# Patient Record
Sex: FEMALE | Race: BLACK OR AFRICAN AMERICAN | NOT HISPANIC OR LATINO | URBAN - METROPOLITAN AREA
[De-identification: names, ages, dates, MRNs, and addresses within clinical notes are randomized per-mention and may not be internally consistent; named-entity substitution may affect disease eponyms.]

---

## 2017-09-15 ENCOUNTER — EMERGENCY (EMERGENCY)
Facility: HOSPITAL | Age: 72
LOS: 1 days | Discharge: HOME | End: 2017-09-15
Attending: EMERGENCY MEDICINE | Admitting: EMERGENCY MEDICINE
Payer: MEDICARE

## 2017-09-15 VITALS
HEART RATE: 84 BPM | RESPIRATION RATE: 18 BRPM | SYSTOLIC BLOOD PRESSURE: 154 MMHG | TEMPERATURE: 98 F | OXYGEN SATURATION: 99 % | DIASTOLIC BLOOD PRESSURE: 80 MMHG

## 2017-09-15 VITALS
HEART RATE: 96 BPM | WEIGHT: 231.93 LBS | DIASTOLIC BLOOD PRESSURE: 81 MMHG | SYSTOLIC BLOOD PRESSURE: 136 MMHG | TEMPERATURE: 100 F | OXYGEN SATURATION: 98 % | RESPIRATION RATE: 20 BRPM

## 2017-09-15 DIAGNOSIS — Z90.710 ACQUIRED ABSENCE OF BOTH CERVIX AND UTERUS: Chronic | ICD-10-CM

## 2017-09-15 LAB
ALBUMIN SERPL ELPH-MCNC: 3.9 G/DL — SIGNIFICANT CHANGE UP (ref 3.3–5)
ALP SERPL-CCNC: 72 U/L — SIGNIFICANT CHANGE UP (ref 40–120)
ALT FLD-CCNC: 17 U/L RC — SIGNIFICANT CHANGE UP (ref 10–45)
ANION GAP SERPL CALC-SCNC: 14 MMOL/L — SIGNIFICANT CHANGE UP (ref 5–17)
APPEARANCE UR: CLEAR — SIGNIFICANT CHANGE UP
AST SERPL-CCNC: 25 U/L — SIGNIFICANT CHANGE UP (ref 10–40)
BASOPHILS # BLD AUTO: 0.1 K/UL — SIGNIFICANT CHANGE UP (ref 0–0.2)
BASOPHILS NFR BLD AUTO: 0.6 % — SIGNIFICANT CHANGE UP (ref 0–2)
BILIRUB SERPL-MCNC: 0.4 MG/DL — SIGNIFICANT CHANGE UP (ref 0.2–1.2)
BILIRUB UR-MCNC: NEGATIVE — SIGNIFICANT CHANGE UP
BUN SERPL-MCNC: 17 MG/DL — SIGNIFICANT CHANGE UP (ref 7–23)
CALCIUM SERPL-MCNC: 9.3 MG/DL — SIGNIFICANT CHANGE UP (ref 8.4–10.5)
CHLORIDE SERPL-SCNC: 101 MMOL/L — SIGNIFICANT CHANGE UP (ref 96–108)
CO2 SERPL-SCNC: 25 MMOL/L — SIGNIFICANT CHANGE UP (ref 22–31)
COLOR SPEC: YELLOW — SIGNIFICANT CHANGE UP
CREAT SERPL-MCNC: 0.99 MG/DL — SIGNIFICANT CHANGE UP (ref 0.5–1.3)
DIFF PNL FLD: NEGATIVE — SIGNIFICANT CHANGE UP
EOSINOPHIL # BLD AUTO: 0.3 K/UL — SIGNIFICANT CHANGE UP (ref 0–0.5)
EOSINOPHIL NFR BLD AUTO: 2.7 % — SIGNIFICANT CHANGE UP (ref 0–6)
GAS PNL BLDV: SIGNIFICANT CHANGE UP
GLUCOSE SERPL-MCNC: 95 MG/DL — SIGNIFICANT CHANGE UP (ref 70–99)
GLUCOSE UR QL: NEGATIVE — SIGNIFICANT CHANGE UP
HCT VFR BLD CALC: 41.4 % — SIGNIFICANT CHANGE UP (ref 34.5–45)
HGB BLD-MCNC: 13.4 G/DL — SIGNIFICANT CHANGE UP (ref 11.5–15.5)
KETONES UR-MCNC: NEGATIVE — SIGNIFICANT CHANGE UP
LEUKOCYTE ESTERASE UR-ACNC: ABNORMAL
LYMPHOCYTES # BLD AUTO: 1.6 K/UL — SIGNIFICANT CHANGE UP (ref 1–3.3)
LYMPHOCYTES # BLD AUTO: 15.1 % — SIGNIFICANT CHANGE UP (ref 13–44)
MAGNESIUM SERPL-MCNC: 1.8 MG/DL — SIGNIFICANT CHANGE UP (ref 1.6–2.6)
MCHC RBC-ENTMCNC: 28.3 PG — SIGNIFICANT CHANGE UP (ref 27–34)
MCHC RBC-ENTMCNC: 32.3 GM/DL — SIGNIFICANT CHANGE UP (ref 32–36)
MCV RBC AUTO: 87.6 FL — SIGNIFICANT CHANGE UP (ref 80–100)
MONOCYTES # BLD AUTO: 1 K/UL — HIGH (ref 0–0.9)
MONOCYTES NFR BLD AUTO: 9.1 % — SIGNIFICANT CHANGE UP (ref 2–14)
NEUTROPHILS # BLD AUTO: 7.7 K/UL — HIGH (ref 1.8–7.4)
NEUTROPHILS NFR BLD AUTO: 72.5 % — SIGNIFICANT CHANGE UP (ref 43–77)
NITRITE UR-MCNC: NEGATIVE — SIGNIFICANT CHANGE UP
PH UR: 5.5 — SIGNIFICANT CHANGE UP (ref 5–8)
PHOSPHATE SERPL-MCNC: 2.9 MG/DL — SIGNIFICANT CHANGE UP (ref 2.5–4.5)
PLATELET # BLD AUTO: 299 K/UL — SIGNIFICANT CHANGE UP (ref 150–400)
POTASSIUM SERPL-MCNC: 4.3 MMOL/L — SIGNIFICANT CHANGE UP (ref 3.5–5.3)
POTASSIUM SERPL-SCNC: 4.3 MMOL/L — SIGNIFICANT CHANGE UP (ref 3.5–5.3)
PROT SERPL-MCNC: 7.7 G/DL — SIGNIFICANT CHANGE UP (ref 6–8.3)
PROT UR-MCNC: NEGATIVE — SIGNIFICANT CHANGE UP
RBC # BLD: 4.73 M/UL — SIGNIFICANT CHANGE UP (ref 3.8–5.2)
RBC # FLD: 13.5 % — SIGNIFICANT CHANGE UP (ref 10.3–14.5)
SODIUM SERPL-SCNC: 140 MMOL/L — SIGNIFICANT CHANGE UP (ref 135–145)
SP GR SPEC: 1.02 — SIGNIFICANT CHANGE UP (ref 1.01–1.02)
UROBILINOGEN FLD QL: NEGATIVE — SIGNIFICANT CHANGE UP
WBC # BLD: 10.6 K/UL — HIGH (ref 3.8–10.5)
WBC # FLD AUTO: 10.6 K/UL — HIGH (ref 3.8–10.5)

## 2017-09-15 PROCEDURE — 96374 THER/PROPH/DIAG INJ IV PUSH: CPT

## 2017-09-15 PROCEDURE — 85027 COMPLETE CBC AUTOMATED: CPT

## 2017-09-15 PROCEDURE — 85014 HEMATOCRIT: CPT

## 2017-09-15 PROCEDURE — 82435 ASSAY OF BLOOD CHLORIDE: CPT

## 2017-09-15 PROCEDURE — 99284 EMERGENCY DEPT VISIT MOD MDM: CPT | Mod: GC

## 2017-09-15 PROCEDURE — 74176 CT ABD & PELVIS W/O CONTRAST: CPT

## 2017-09-15 PROCEDURE — 82803 BLOOD GASES ANY COMBINATION: CPT

## 2017-09-15 PROCEDURE — 83605 ASSAY OF LACTIC ACID: CPT

## 2017-09-15 PROCEDURE — 81001 URINALYSIS AUTO W/SCOPE: CPT

## 2017-09-15 PROCEDURE — 84100 ASSAY OF PHOSPHORUS: CPT

## 2017-09-15 PROCEDURE — 84132 ASSAY OF SERUM POTASSIUM: CPT

## 2017-09-15 PROCEDURE — 82947 ASSAY GLUCOSE BLOOD QUANT: CPT

## 2017-09-15 PROCEDURE — 80053 COMPREHEN METABOLIC PANEL: CPT

## 2017-09-15 PROCEDURE — 84295 ASSAY OF SERUM SODIUM: CPT

## 2017-09-15 PROCEDURE — 74176 CT ABD & PELVIS W/O CONTRAST: CPT | Mod: 26

## 2017-09-15 PROCEDURE — 82330 ASSAY OF CALCIUM: CPT

## 2017-09-15 PROCEDURE — 99284 EMERGENCY DEPT VISIT MOD MDM: CPT | Mod: 25

## 2017-09-15 PROCEDURE — 87086 URINE CULTURE/COLONY COUNT: CPT

## 2017-09-15 PROCEDURE — 83735 ASSAY OF MAGNESIUM: CPT

## 2017-09-15 RX ORDER — METRONIDAZOLE 500 MG
500 TABLET ORAL ONCE
Qty: 0 | Refills: 0 | Status: COMPLETED | OUTPATIENT
Start: 2017-09-15 | End: 2017-09-15

## 2017-09-15 RX ORDER — METOPROLOL TARTRATE 50 MG
25 TABLET ORAL
Qty: 0 | Refills: 0 | Status: DISCONTINUED | OUTPATIENT
Start: 2017-09-15 | End: 2017-09-15

## 2017-09-15 RX ORDER — SODIUM CHLORIDE 9 MG/ML
1000 INJECTION INTRAMUSCULAR; INTRAVENOUS; SUBCUTANEOUS
Qty: 0 | Refills: 0 | Status: DISCONTINUED | OUTPATIENT
Start: 2017-09-15 | End: 2017-09-19

## 2017-09-15 RX ORDER — CIPROFLOXACIN LACTATE 400MG/40ML
1 VIAL (ML) INTRAVENOUS
Qty: 20 | Refills: 0
Start: 2017-09-15 | End: 2017-09-25

## 2017-09-15 RX ORDER — METRONIDAZOLE 500 MG
1 TABLET ORAL
Qty: 21 | Refills: 0
Start: 2017-09-15 | End: 2017-09-22

## 2017-09-15 RX ORDER — ACETAMINOPHEN 500 MG
1000 TABLET ORAL ONCE
Qty: 0 | Refills: 0 | Status: COMPLETED | OUTPATIENT
Start: 2017-09-15 | End: 2017-09-15

## 2017-09-15 RX ORDER — CIPROFLOXACIN LACTATE 400MG/40ML
500 VIAL (ML) INTRAVENOUS ONCE
Qty: 0 | Refills: 0 | Status: COMPLETED | OUTPATIENT
Start: 2017-09-15 | End: 2017-09-15

## 2017-09-15 RX ADMIN — Medication 1000 MILLIGRAM(S): at 15:58

## 2017-09-15 RX ADMIN — SODIUM CHLORIDE 150 MILLILITER(S): 9 INJECTION INTRAMUSCULAR; INTRAVENOUS; SUBCUTANEOUS at 15:32

## 2017-09-15 RX ADMIN — Medication 500 MILLIGRAM(S): at 19:06

## 2017-09-15 RX ADMIN — Medication 400 MILLIGRAM(S): at 15:49

## 2017-09-15 NOTE — ED PROVIDER NOTE - OBJECTIVE STATEMENT
72F with LLQ abd pain, h/o diverticulitis. 1 day of pain, moderate, sharp, nonradiating, no n/v/f/c, no urinary sx's, has not taken anything for pain yet

## 2017-09-15 NOTE — ED ADULT NURSE NOTE - OBJECTIVE STATEMENT
72 year old female presented to ED with c/o of lower abdominal pain since Wednesday. Pt denies pain radiating anywhere. Pt states the pain is aching and 5/10 on verbal pain scale. Pt denies CP, SOB, nausea/vomiting, numbness/tingling, fever, cough, chills, dizziness, headache. Pt a&ox3, lung sounds clear, heart rate regular, abdomen soft nontender nondistended to palp, +BS in all four quadrants, normal BM, voiding regular. Skin intact. IV in left AC 20G and patent. Pt currently resting in bed with family at bedside, side rails up for safety. Will continue to monitor and reassess while offering support and reassurance.

## 2017-09-15 NOTE — ED PROVIDER NOTE - ATTENDING CONTRIBUTION TO CARE
Attending MD Arteaga:  I personally have seen and examined this patient.  Resident note reviewed and agree on plan of care and except where noted.  See HPI, PE, and MDM for details.

## 2017-09-15 NOTE — ED PROVIDER NOTE - PHYSICAL EXAMINATION
Attending MD Arteaga: A & O x 3, NAD, obese, EOMI b/l, PERRL b/l; lungs CTAB, heart with reg rhythm without murmur; abdomen soft ND, mild ttp in LLQ, extremities with no edema; affect appropriate. neuro exam non focal with no motor or sensory deficits.

## 2017-09-15 NOTE — ED PROVIDER NOTE - PROGRESS NOTE DETAILS
attending Brent: Pt signed out to me by Dr. Arteaga at usual time of shift change pending CT A/P. CT shows uncomplicated diverticulitis. Results reviewed with patient and  at bedside. Will dc with cipro/flagyl, instruction to refrain from alcohol while taking flagyl, GI follow-up and strict return precautions.

## 2017-09-15 NOTE — ED PROVIDER NOTE - MEDICAL DECISION MAKING DETAILS
Attending MD Arteaga: 72F with h/o diverticulitis pw LLQ abd pain, mild ttp in LLQ, eval diverticulitis with CT a/p, re-eval

## 2017-09-15 NOTE — ED ADULT NURSE NOTE - PMH
Bone Spur right foot -current    CVA (Cerebral Infarction) s/p CVA 2007 - no residual symptoms    DM (Diabetes Mellitus)    Heart Murmur    History of Tonsillectomy    HTN (Hypertension)    Hypercholesteremia    Scoliosis    Uterine Leiomyoma s/p supracervical hysterectomy; BSO 1992

## 2017-09-15 NOTE — ED ADULT NURSE NOTE - CHPI ED SYMPTOMS NEG
no blood in stool/no vomiting/no nausea/no chills/no diarrhea/no fever/no burning urination/no abdominal distension/no dysuria/no hematuria

## 2017-09-16 LAB
C TRACH RRNA SPEC QL NAA+PROBE: SIGNIFICANT CHANGE UP
CULTURE RESULTS: NO GROWTH — SIGNIFICANT CHANGE UP
N GONORRHOEA RRNA SPEC QL NAA+PROBE: SIGNIFICANT CHANGE UP
SPECIMEN SOURCE: SIGNIFICANT CHANGE UP
SPECIMEN SOURCE: SIGNIFICANT CHANGE UP

## 2019-10-10 ENCOUNTER — INPATIENT (INPATIENT)
Facility: HOSPITAL | Age: 74
LOS: 1 days | Discharge: HOME | DRG: 392 | End: 2019-10-12
Attending: SURGERY | Admitting: SURGERY
Payer: MEDICARE

## 2019-10-10 VITALS
HEART RATE: 83 BPM | OXYGEN SATURATION: 96 % | TEMPERATURE: 98 F | RESPIRATION RATE: 20 BRPM | DIASTOLIC BLOOD PRESSURE: 84 MMHG | SYSTOLIC BLOOD PRESSURE: 143 MMHG | WEIGHT: 225.09 LBS | HEIGHT: 65 IN

## 2019-10-10 DIAGNOSIS — Z90.710 ACQUIRED ABSENCE OF BOTH CERVIX AND UTERUS: Chronic | ICD-10-CM

## 2019-10-10 LAB
ALBUMIN SERPL ELPH-MCNC: 3.7 G/DL — SIGNIFICANT CHANGE UP (ref 3.3–5)
ALP SERPL-CCNC: 72 U/L — SIGNIFICANT CHANGE UP (ref 40–120)
ALT FLD-CCNC: 13 U/L — SIGNIFICANT CHANGE UP (ref 10–45)
ANION GAP SERPL CALC-SCNC: 12 MMOL/L — SIGNIFICANT CHANGE UP (ref 5–17)
APPEARANCE UR: CLEAR — SIGNIFICANT CHANGE UP
AST SERPL-CCNC: 13 U/L — SIGNIFICANT CHANGE UP (ref 10–40)
BACTERIA # UR AUTO: NEGATIVE — SIGNIFICANT CHANGE UP
BASE EXCESS BLDV CALC-SCNC: 4 MMOL/L — HIGH (ref -2–2)
BASOPHILS # BLD AUTO: 0.06 K/UL — SIGNIFICANT CHANGE UP (ref 0–0.2)
BASOPHILS NFR BLD AUTO: 0.6 % — SIGNIFICANT CHANGE UP (ref 0–2)
BILIRUB SERPL-MCNC: 0.2 MG/DL — SIGNIFICANT CHANGE UP (ref 0.2–1.2)
BILIRUB UR-MCNC: NEGATIVE — SIGNIFICANT CHANGE UP
BUN SERPL-MCNC: 14 MG/DL — SIGNIFICANT CHANGE UP (ref 7–23)
CA-I SERPL-SCNC: 1.18 MMOL/L — SIGNIFICANT CHANGE UP (ref 1.12–1.3)
CALCIUM SERPL-MCNC: 9.2 MG/DL — SIGNIFICANT CHANGE UP (ref 8.4–10.5)
CHLORIDE BLDV-SCNC: 107 MMOL/L — SIGNIFICANT CHANGE UP (ref 96–108)
CHLORIDE SERPL-SCNC: 101 MMOL/L — SIGNIFICANT CHANGE UP (ref 96–108)
CO2 BLDV-SCNC: 32 MMOL/L — HIGH (ref 22–30)
CO2 SERPL-SCNC: 27 MMOL/L — SIGNIFICANT CHANGE UP (ref 22–31)
COLOR SPEC: SIGNIFICANT CHANGE UP
CREAT SERPL-MCNC: 0.95 MG/DL — SIGNIFICANT CHANGE UP (ref 0.5–1.3)
DIFF PNL FLD: NEGATIVE — SIGNIFICANT CHANGE UP
EOSINOPHIL # BLD AUTO: 0.38 K/UL — SIGNIFICANT CHANGE UP (ref 0–0.5)
EOSINOPHIL NFR BLD AUTO: 3.7 % — SIGNIFICANT CHANGE UP (ref 0–6)
EPI CELLS # UR: 0 /HPF — SIGNIFICANT CHANGE UP
GAS PNL BLDV: 138 MMOL/L — SIGNIFICANT CHANGE UP (ref 135–145)
GAS PNL BLDV: SIGNIFICANT CHANGE UP
GAS PNL BLDV: SIGNIFICANT CHANGE UP
GLUCOSE BLDV-MCNC: 172 MG/DL — HIGH (ref 70–99)
GLUCOSE SERPL-MCNC: 180 MG/DL — HIGH (ref 70–99)
GLUCOSE UR QL: NEGATIVE — SIGNIFICANT CHANGE UP
HCO3 BLDV-SCNC: 30 MMOL/L — HIGH (ref 21–29)
HCT VFR BLD CALC: 40.6 % — SIGNIFICANT CHANGE UP (ref 34.5–45)
HCT VFR BLDA CALC: 41 % — SIGNIFICANT CHANGE UP (ref 39–50)
HGB BLD CALC-MCNC: 13.4 G/DL — SIGNIFICANT CHANGE UP (ref 11.5–15.5)
HGB BLD-MCNC: 12.6 G/DL — SIGNIFICANT CHANGE UP (ref 11.5–15.5)
HYALINE CASTS # UR AUTO: 0 /LPF — SIGNIFICANT CHANGE UP (ref 0–2)
IMM GRANULOCYTES NFR BLD AUTO: 1.1 % — SIGNIFICANT CHANGE UP (ref 0–1.5)
KETONES UR-MCNC: NEGATIVE — SIGNIFICANT CHANGE UP
LACTATE BLDV-MCNC: 1.5 MMOL/L — SIGNIFICANT CHANGE UP (ref 0.7–2)
LEUKOCYTE ESTERASE UR-ACNC: ABNORMAL
LIDOCAIN IGE QN: 22 U/L — SIGNIFICANT CHANGE UP (ref 7–60)
LYMPHOCYTES # BLD AUTO: 1.41 K/UL — SIGNIFICANT CHANGE UP (ref 1–3.3)
LYMPHOCYTES # BLD AUTO: 13.8 % — SIGNIFICANT CHANGE UP (ref 13–44)
MCHC RBC-ENTMCNC: 26.5 PG — LOW (ref 27–34)
MCHC RBC-ENTMCNC: 31 GM/DL — LOW (ref 32–36)
MCV RBC AUTO: 85.5 FL — SIGNIFICANT CHANGE UP (ref 80–100)
MONOCYTES # BLD AUTO: 0.88 K/UL — SIGNIFICANT CHANGE UP (ref 0–0.9)
MONOCYTES NFR BLD AUTO: 8.6 % — SIGNIFICANT CHANGE UP (ref 2–14)
NEUTROPHILS # BLD AUTO: 7.35 K/UL — SIGNIFICANT CHANGE UP (ref 1.8–7.4)
NEUTROPHILS NFR BLD AUTO: 72.2 % — SIGNIFICANT CHANGE UP (ref 43–77)
NITRITE UR-MCNC: NEGATIVE — SIGNIFICANT CHANGE UP
NRBC # BLD: 0 /100 WBCS — SIGNIFICANT CHANGE UP (ref 0–0)
PCO2 BLDV: 54 MMHG — HIGH (ref 35–50)
PH BLDV: 7.36 — SIGNIFICANT CHANGE UP (ref 7.35–7.45)
PH UR: 5.5 — SIGNIFICANT CHANGE UP (ref 5–8)
PLATELET # BLD AUTO: 370 K/UL — SIGNIFICANT CHANGE UP (ref 150–400)
PO2 BLDV: 26 MMHG — SIGNIFICANT CHANGE UP (ref 25–45)
POTASSIUM BLDV-SCNC: 3.5 MMOL/L — SIGNIFICANT CHANGE UP (ref 3.5–5.3)
POTASSIUM SERPL-MCNC: 3.5 MMOL/L — SIGNIFICANT CHANGE UP (ref 3.5–5.3)
POTASSIUM SERPL-SCNC: 3.5 MMOL/L — SIGNIFICANT CHANGE UP (ref 3.5–5.3)
PROT SERPL-MCNC: 7 G/DL — SIGNIFICANT CHANGE UP (ref 6–8.3)
PROT UR-MCNC: NEGATIVE — SIGNIFICANT CHANGE UP
RBC # BLD: 4.75 M/UL — SIGNIFICANT CHANGE UP (ref 3.8–5.2)
RBC # FLD: 15.5 % — HIGH (ref 10.3–14.5)
RBC CASTS # UR COMP ASSIST: 1 /HPF — SIGNIFICANT CHANGE UP (ref 0–4)
SAO2 % BLDV: 39 % — LOW (ref 67–88)
SODIUM SERPL-SCNC: 140 MMOL/L — SIGNIFICANT CHANGE UP (ref 135–145)
SP GR SPEC: 1.02 — SIGNIFICANT CHANGE UP (ref 1.01–1.02)
UROBILINOGEN FLD QL: NEGATIVE — SIGNIFICANT CHANGE UP
WBC # BLD: 10.19 K/UL — SIGNIFICANT CHANGE UP (ref 3.8–10.5)
WBC # FLD AUTO: 10.19 K/UL — SIGNIFICANT CHANGE UP (ref 3.8–10.5)
WBC UR QL: 2 /HPF — SIGNIFICANT CHANGE UP (ref 0–5)

## 2019-10-10 PROCEDURE — 71045 X-RAY EXAM CHEST 1 VIEW: CPT | Mod: 26

## 2019-10-10 PROCEDURE — 99285 EMERGENCY DEPT VISIT HI MDM: CPT | Mod: GC

## 2019-10-10 PROCEDURE — 74176 CT ABD & PELVIS W/O CONTRAST: CPT | Mod: 26

## 2019-10-10 NOTE — ED PROVIDER NOTE - PHYSICAL EXAMINATION
GENERAL: no acute distress, non-toxic appearing  HEAD: normocephalic, atraumatic  HEENT: normal conjunctiva, oral mucosa moist, neck supple  CARDIAC: regular rate and rhythm, normal S1 and S2, no appreciable murmurs  PULM: normal breath sounds, clear to ascultation bilaterally, no rales, rhonchi, or wheezing  GI: abdomen nondistended, soft, minimal tenderness in the LLQ, no guarding or rebound tenderness  : no CVA tenderness b/l, no suprapubic pain  NEURO: AAOx3, normal speech, PERRLA, EOMI, no focal motor or sensory deficits, normal gait  MSK: no peripheral edema, no calf tenderness/redness/swelling, no visible deformities  SKIN: well-perfused, extremities warm, no visible rashes  PSYCH: appropriate mood and affect

## 2019-10-10 NOTE — ED ADULT NURSE NOTE - OBJECTIVE STATEMENT
74 year old female arrives to ED from home via walk-in complaining of abdominal pain x past week. PMH of diverticulitis. patient states pain is similar to previous diverticulitis flare ups. patient complains of pain 9/10, slight tenderness in left lower quadrant upon palpation and more frequent bowel movements. bowel movements soft but not watery. bowel sounds present in all four quadrants. no abdominal distention noted. patient denies nausea/vomiting, chest pain, SOB, fevers, and chills. IV inserted 20g left AC. family at bedside. comfort and safety measures maintained

## 2019-10-10 NOTE — ED PROVIDER NOTE - NS ED ROS FT
GENERAL: denies fever, chills  HEENT: denies headaches, lightheadedness, dizziness, vision changes, congestion, trouble swallowing  CARDIAC: denies chest pain, palpitations  PULM: denies dyspnea, wheezing, cough  GI: +abdominal pain; denies nausea, vomiting, diarrhea, constipation, melena, hematochezia  : denies dysuria, frequency, incontinence, hematuria  NEURO: denies motor weakness, sensory changes  MSK: denies joint, muscle pain  SKIN: denies new rashes, hives  HEME: denies active bleeding, bruising

## 2019-10-10 NOTE — ED PROVIDER NOTE - OBJECTIVE STATEMENT
75y/o F h/o T2DM, HTN, HLD, diverticulitis and PSH hysterectomy, ovarian cyst removal p/w abdominal pain. Pt states that the abdominal pain has been ongoing for 1 week and is characterized as non radiating sharp, localized on the left lower abdomen. Today the pain is worse and is more constant. She states this pain feels similar to a previous flare of diverticulum from last year. Denies fevers, chills, nausea, vomiting, chest pain, SOB, anorexia, constipation, diarrhea, hematochezia.

## 2019-10-10 NOTE — ED PROVIDER NOTE - ATTENDING CONTRIBUTION TO CARE
pt is a 75 y/o female presenting with llq tend with no other sts h/o diverticulitis, with iv contrast allergy last time had a po only study instead of prep, vss, abd soft, with mild llq tend, labs, ua, ct ordered.

## 2019-10-10 NOTE — ED PROVIDER NOTE - CLINICAL SUMMARY MEDICAL DECISION MAKING FREE TEXT BOX
See Attending Note 75y/o F h/o T2DM, HTN, HLD, diverticulitis and PSH hysterectomy, ovarian cyst removal p/w non radiating sharp abdominal pain localized on the left lower abdomen that feels similar to a previous flare of diverticulum from last year. Belly is soft and minimally tender in the LLQ. Pt has IV contrast allergy which she states gave her rash and does not want IV contrast. Basic labs, VBG w/ lactate, IVF, CTa/p w/ oral contrast r/i diverticulitis. Likely D/c home with PO Abx

## 2019-10-11 DIAGNOSIS — K57.92 DIVERTICULITIS OF INTESTINE, PART UNSPECIFIED, WITHOUT PERFORATION OR ABSCESS WITHOUT BLEEDING: ICD-10-CM

## 2019-10-11 DIAGNOSIS — E89.6 POSTPROCEDURAL ADRENOCORTICAL (-MEDULLARY) HYPOFUNCTION: Chronic | ICD-10-CM

## 2019-10-11 LAB
ANION GAP SERPL CALC-SCNC: 9 MMOL/L — SIGNIFICANT CHANGE UP (ref 5–17)
BUN SERPL-MCNC: 10 MG/DL — SIGNIFICANT CHANGE UP (ref 7–23)
CALCIUM SERPL-MCNC: 9.2 MG/DL — SIGNIFICANT CHANGE UP (ref 8.4–10.5)
CHLORIDE SERPL-SCNC: 105 MMOL/L — SIGNIFICANT CHANGE UP (ref 96–108)
CO2 SERPL-SCNC: 27 MMOL/L — SIGNIFICANT CHANGE UP (ref 22–31)
CREAT SERPL-MCNC: 0.95 MG/DL — SIGNIFICANT CHANGE UP (ref 0.5–1.3)
GLUCOSE SERPL-MCNC: 112 MG/DL — HIGH (ref 70–99)
HCT VFR BLD CALC: 38.2 % — SIGNIFICANT CHANGE UP (ref 34.5–45)
HCV AB S/CO SERPL IA: 0.09 S/CO — SIGNIFICANT CHANGE UP (ref 0–0.99)
HCV AB SERPL-IMP: SIGNIFICANT CHANGE UP
HGB BLD-MCNC: 12.3 G/DL — SIGNIFICANT CHANGE UP (ref 11.5–15.5)
LACTATE SERPL-SCNC: 0.8 MMOL/L — SIGNIFICANT CHANGE UP (ref 0.7–2)
MAGNESIUM SERPL-MCNC: 1.8 MG/DL — SIGNIFICANT CHANGE UP (ref 1.6–2.6)
MCHC RBC-ENTMCNC: 27.5 PG — SIGNIFICANT CHANGE UP (ref 27–34)
MCHC RBC-ENTMCNC: 32.2 GM/DL — SIGNIFICANT CHANGE UP (ref 32–36)
MCV RBC AUTO: 85.3 FL — SIGNIFICANT CHANGE UP (ref 80–100)
NRBC # BLD: 0 /100 WBCS — SIGNIFICANT CHANGE UP (ref 0–0)
PHOSPHATE SERPL-MCNC: 3.1 MG/DL — SIGNIFICANT CHANGE UP (ref 2.5–4.5)
PLATELET # BLD AUTO: 334 K/UL — SIGNIFICANT CHANGE UP (ref 150–400)
POTASSIUM SERPL-MCNC: 4.4 MMOL/L — SIGNIFICANT CHANGE UP (ref 3.5–5.3)
POTASSIUM SERPL-SCNC: 4.4 MMOL/L — SIGNIFICANT CHANGE UP (ref 3.5–5.3)
RBC # BLD: 4.48 M/UL — SIGNIFICANT CHANGE UP (ref 3.8–5.2)
RBC # FLD: 15.6 % — HIGH (ref 10.3–14.5)
SODIUM SERPL-SCNC: 141 MMOL/L — SIGNIFICANT CHANGE UP (ref 135–145)
WBC # BLD: 8.62 K/UL — SIGNIFICANT CHANGE UP (ref 3.8–10.5)
WBC # FLD AUTO: 8.62 K/UL — SIGNIFICANT CHANGE UP (ref 3.8–10.5)

## 2019-10-11 PROCEDURE — 99223 1ST HOSP IP/OBS HIGH 75: CPT

## 2019-10-11 RX ORDER — METRONIDAZOLE 500 MG
500 TABLET ORAL EVERY 8 HOURS
Refills: 0 | Status: DISCONTINUED | OUTPATIENT
Start: 2019-10-11 | End: 2019-10-12

## 2019-10-11 RX ORDER — CIPROFLOXACIN LACTATE 400MG/40ML
400 VIAL (ML) INTRAVENOUS EVERY 12 HOURS
Refills: 0 | Status: DISCONTINUED | OUTPATIENT
Start: 2019-10-11 | End: 2019-10-12

## 2019-10-11 RX ORDER — SIMVASTATIN 20 MG/1
1 TABLET, FILM COATED ORAL
Qty: 0 | Refills: 0 | DISCHARGE

## 2019-10-11 RX ORDER — INFLUENZA VIRUS VACCINE 15; 15; 15; 15 UG/.5ML; UG/.5ML; UG/.5ML; UG/.5ML
0.5 SUSPENSION INTRAMUSCULAR ONCE
Refills: 0 | Status: DISCONTINUED | OUTPATIENT
Start: 2019-10-11 | End: 2019-10-12

## 2019-10-11 RX ORDER — CIPROFLOXACIN LACTATE 400MG/40ML
VIAL (ML) INTRAVENOUS
Refills: 0 | Status: DISCONTINUED | OUTPATIENT
Start: 2019-10-11 | End: 2019-10-12

## 2019-10-11 RX ORDER — ATORVASTATIN CALCIUM 80 MG/1
1 TABLET, FILM COATED ORAL
Qty: 0 | Refills: 0 | DISCHARGE

## 2019-10-11 RX ORDER — METRONIDAZOLE 500 MG
500 TABLET ORAL ONCE
Refills: 0 | Status: COMPLETED | OUTPATIENT
Start: 2019-10-11 | End: 2019-10-11

## 2019-10-11 RX ORDER — MAGNESIUM SULFATE 500 MG/ML
1 VIAL (ML) INJECTION ONCE
Refills: 0 | Status: COMPLETED | OUTPATIENT
Start: 2019-10-11 | End: 2019-10-11

## 2019-10-11 RX ORDER — SODIUM CHLORIDE 9 MG/ML
1000 INJECTION, SOLUTION INTRAVENOUS
Refills: 0 | Status: DISCONTINUED | OUTPATIENT
Start: 2019-10-11 | End: 2019-10-11

## 2019-10-11 RX ORDER — METRONIDAZOLE 500 MG
TABLET ORAL
Refills: 0 | Status: DISCONTINUED | OUTPATIENT
Start: 2019-10-11 | End: 2019-10-12

## 2019-10-11 RX ORDER — DEXTROSE MONOHYDRATE, SODIUM CHLORIDE, AND POTASSIUM CHLORIDE 50; .745; 4.5 G/1000ML; G/1000ML; G/1000ML
1000 INJECTION, SOLUTION INTRAVENOUS
Refills: 0 | Status: DISCONTINUED | OUTPATIENT
Start: 2019-10-11 | End: 2019-10-11

## 2019-10-11 RX ORDER — ENOXAPARIN SODIUM 100 MG/ML
40 INJECTION SUBCUTANEOUS DAILY
Refills: 0 | Status: DISCONTINUED | OUTPATIENT
Start: 2019-10-11 | End: 2019-10-12

## 2019-10-11 RX ORDER — CIPROFLOXACIN LACTATE 400MG/40ML
400 VIAL (ML) INTRAVENOUS ONCE
Refills: 0 | Status: COMPLETED | OUTPATIENT
Start: 2019-10-11 | End: 2019-10-11

## 2019-10-11 RX ADMIN — Medication 100 GRAM(S): at 10:11

## 2019-10-11 RX ADMIN — ENOXAPARIN SODIUM 40 MILLIGRAM(S): 100 INJECTION SUBCUTANEOUS at 11:52

## 2019-10-11 RX ADMIN — SODIUM CHLORIDE 75 MILLILITER(S): 9 INJECTION, SOLUTION INTRAVENOUS at 04:43

## 2019-10-11 RX ADMIN — Medication 200 MILLIGRAM(S): at 04:43

## 2019-10-11 RX ADMIN — Medication 100 MILLIGRAM(S): at 23:12

## 2019-10-11 RX ADMIN — Medication 100 MILLIGRAM(S): at 14:07

## 2019-10-11 RX ADMIN — Medication 100 MILLIGRAM(S): at 02:34

## 2019-10-11 RX ADMIN — Medication 200 MILLIGRAM(S): at 17:33

## 2019-10-11 RX ADMIN — DEXTROSE MONOHYDRATE, SODIUM CHLORIDE, AND POTASSIUM CHLORIDE 75 MILLILITER(S): 50; .745; 4.5 INJECTION, SOLUTION INTRAVENOUS at 10:11

## 2019-10-11 NOTE — PROGRESS NOTE ADULT - ASSESSMENT
74 year old F HD0 p/w abdominal pain with a known history of diverticulosis and 1 prior episode of diverticulitis. Afebrile and HD stable. CT findings consistent with a Hinchey 1b diverticulitis. No emergent surgical intervention is warranted.    PLAN:  - NPO, IVF with PRN pain medications with abdominal exams prior.  - IV abx  - No surgical intervention warranted at this time.  - OOB  - DVT ppx    Red surgery,  [id number]

## 2019-10-11 NOTE — H&P ADULT - ATTENDING COMMENTS
pt with previous episode of diverticulitis now with recurrent pain  She says that she feels better this am than on admission    aaox3  abd soft, NT, no peritonitis    CT with sigmoid diverticulitis and small abscess    IV abx  clears today  serial abd exams

## 2019-10-11 NOTE — H&P ADULT - HISTORY OF PRESENT ILLNESS
Ms. Linda Negron is a 74 year old F with a pmhx of HTN, HLD, T2DM, CVA, diverticulosis c/b 1 episode of diverticulitis in 2017 who presents to the ED with abdominal pain. Ms. Linda Negron is a 74 year old F with a pmhx of HTN, HLD, T2DM, CVA, diverticulosis c/b 1 episode of diverticulitis in 2017 who presents to the ED with abdominal pain. The patient reports that she has been having LLQ abdominal pain for the past week but worsened over the last day. She describes a sharp, constant, non-radiating LLQ pain similar to her last bout 2 years ago. She denies any associated fevers, chills, nausea, vomiting, or localizing signs. Her last BM was this morning and was soft, without blood.     Her last colonoscopy was earlier this year at an outside facility. Per the patient, findings were pertinent for diverticulosis and 1 polyp was found and removed.

## 2019-10-11 NOTE — H&P ADULT - NSHPLABSRESULTS_GEN_ALL_CORE
LABS  CBC (10-10 @ 21:38)                              12.6                           10.19   )----------------(  370        72.2  % Neutrophils, 13.8  % Lymphocytes, ANC: 7.35                                40.6      BMP (10-10 @ 21:38)             140     |  101     |  14    		Ca++ --      Ca 9.2                ---------------------------------( 180<H>		Mg --                 3.5     |  27      |  0.95  			Ph --        LFTs (10-10 @ 21:38)      TPro 7.0 / Alb 3.7 / TBili 0.2 / DBili -- / AST 13 / ALT 13 / AlkPhos 72          VBG (10-10 @ 21:38)     7.36 / 54<H> / 26 / 30<H> / 4.0<H> / 39<L>%     Lactate: 1.5    --------------------------------------------------------------------------------------------    MICROBIOLOGY  Urinalysis (10-10 @ 22:45):     Color: Light Yellow / Appearance: Clear / S.016 / pH: 5.5 / Gluc: Negative / Ketones: Negative / Bili: Negative / Urobili: Negative / Protein :Negative / Nitrites: Negative / Leuk.Est: Small<!> / RBC: 1 / WBC: 2 / Sq Epi:  / Non Sq Epi: 0 / Bacteria Negative         --------------------------------------------------------------------------------------------    IMAGING  ***    -------------------------------------------------------------------------------------------- LABS  CBC (10-10 @ 21:38)                              12.6                           10.19   )----------------(  370        72.2  % Neutrophils, 13.8  % Lymphocytes, ANC: 7.35                                40.6      BMP (10-10 @ 21:38)             140     |  101     |  14    		Ca++ --      Ca 9.2                ---------------------------------( 180<H>		Mg --                 3.5     |  27      |  0.95  			Ph --        LFTs (10-10 @ 21:38)      TPro 7.0 / Alb 3.7 / TBili 0.2 / DBili -- / AST 13 / ALT 13 / AlkPhos 72          VBG (10-10 @ 21:38)     7.36 / 54<H> / 26 / 30<H> / 4.0<H> / 39<L>%     Lactate: 1.5    --------------------------------------------------------------------------------------------    MICROBIOLOGY  Urinalysis (10-10 @ 22:45):     Color: Light Yellow / Appearance: Clear / S.016 / pH: 5.5 / Gluc: Negative / Ketones: Negative / Bili: Negative / Urobili: Negative / Protein :Negative / Nitrites: Negative / Leuk.Est: Small<!> / RBC: 1 / WBC: 2 / Sq Epi:  / Non Sq Epi: 0 / Bacteria Negative         --------------------------------------------------------------------------------------------    IMAGING  < from: CT Abdomen and Pelvis w/ Oral Cont (10.10.19 @ 22:33) >    FINDINGS:    LOWER CHEST: Hepatic cysts. Subcentimeter hypodense foci too small to   characterize.    LIVER: Within normal limits.  BILE DUCTS: Normal caliber.  GALLBLADDER: Within normal limits.  SPLEEN: Within normal limits.  PANCREAS: Within normal limits.  ADRENALS: Status post right adrenalectomy.  KIDNEYS/URETERS: The right kidney is unremarkable. A nonobstructing 2 mm   renal calculus within the left renal pelvis. Left parapelvic cysts. No   hydronephrosis.    BLADDER: Underdistended.  REPRODUCTIVE ORGANS: Hysterectomy.    BOWEL: Oral contrast is seen as distally as the distal small bowel. No   bowel obstruction. Appendix is normal. Bowel wall thickening and fat   stranding surrounding the sigmoid diverticuli consistent with acute   diverticulitis. There is a tiny 2.4 x 2.1 cm collection(3:81).  PERITONEUM: No ascites  VESSELS: Within normal limits.  RETROPERITONEUM/LYMPH NODES: No lymphadenopathy.    ABDOMINAL WALL: Within normal limits.  BONES: Within normal limits.    IMPRESSION:     Acute sigmoid diverticulitis complicated by a tiny 2.4 x 2.1 cm   collection between small bowel and sigmoid colon containing air and   fluid.     < end of copied text >        --------------------------------------------------------------------------------------------

## 2019-10-11 NOTE — H&P ADULT - NSICDXPASTMEDICALHX_GEN_ALL_CORE_FT
PAST MEDICAL HISTORY:  Bone Spur right foot -current     CVA (Cerebral Infarction) s/p CVA 2007 - no residual symptoms     DM (Diabetes Mellitus)     Heart Murmur     History of diverticulitis     History of diverticulosis     History of Tonsillectomy     HTN (Hypertension)     Hypercholesteremia     Scoliosis     Uterine Leiomyoma s/p supracervical hysterectomy; BSO 1992

## 2019-10-11 NOTE — PROGRESS NOTE ADULT - SUBJECTIVE AND OBJECTIVE BOX
COLORECTAL SURGERY DAILY PROGRESS NOTE:    Interval:  Admitted overnight for abdominal pain in the setting of diverticulosis    Subjective:  Patient reports pain is well controlled and much improved since admission. Denies N/V.  Passing flatus, -BM.     Vital Signs Last 24 Hrs  T(C): 36.7 (11 Oct 2019 04:23), Max: 36.8 (11 Oct 2019 00:24)  T(F): 98.1 (11 Oct 2019 04:23), Max: 98.2 (11 Oct 2019 00:24)  HR: 76 (11 Oct 2019 04:23) (70 - 83)  BP: 153/85 (11 Oct 2019 04:23) (136/77 - 153/85)  BP(mean): --  RR: 17 (11 Oct 2019 04:23) (17 - 20)  SpO2: 98% (11 Oct 2019 04:23) (94% - 98%)    Exam:  Gen: NAD, resting in bed, alert and responding appropriately  Resp: Airway patent, non-labored respirations  Abd: Soft, ND, Tender to deep palpation, no rebound or guarding.  Ext: No edema, WWP  Neuro: AAOx3, no focal deficits      LABS:                        12.6   10.19 )-----------( 370      ( 10 Oct 2019 21:38 )             40.6     10-10    140  |  101  |  14  ----------------------------<  180<H>  3.5   |  27  |  0.95    Ca    9.2      10 Oct 2019 21:38    TPro  7.0  /  Alb  3.7  /  TBili  0.2  /  DBili  x   /  AST  13  /  ALT  13  /  AlkPhos  72  10-10

## 2019-10-11 NOTE — H&P ADULT - ASSESSMENT
ASSESSMENT: Patient is a 74yf pmhx ***    PLAN:  ***  -   -   -   -   - Patient seen/examined with attending.  - Plan to be discussed with Attending,  Mrs. Linda Negron is a 74 year old F with a known history of diverticulosis and 1 prior episode of diverticulitis who presents to the ED with abdominal pain. Afebrile and HD stable. Mild tenderness to palpation on LLQ, borderline leukocytosis, and CT findings consistent with a Hinchey 1b diverticulitis. No emergent surgical intervention is warranted.    PLAN:  - Admit to Surgery - Red Team, Dr. RACHEL Dey  - NPO, IVF with PRN pain medications with abdominal exams prior.  - No surgical intervention warranted at this time.  - Patient discussed with Colon & Rectal Surgery fellow, Dr. MARIZOL Wyman.  - Plan to be discussed with Attending, Dr. RACHEL Dey.

## 2019-10-11 NOTE — H&P ADULT - NSHPPHYSICALEXAM_GEN_ALL_CORE
Vitals:   T(C): 36.8 (10-11-19 @ 00:24), Max: 36.8 (10-11-19 @ 00:24)  HR: 70 (10-11-19 @ 00:24) (70 - 83)  BP: 136/77 (10-11-19 @ 00:24) (136/77 - 143/84)  RR: 19 (10-11-19 @ 00:24) (19 - 20)  SpO2: 94% (10-11-19 @ 00:24) (94% - 96%)  CAPILLARY BLOOD GLUCOSE          Height (cm): 165.1 (10-10 @ 18:54)  Weight (kg): 102.1 (10-10 @ 18:54)  BMI (kg/m2): 37.5 (10-10 @ 18:54)  BSA (m2): 2.08 (10-10 @ 18:54)      PHYSICAL EXAM: ***  General: NAD, Lying in bed comfortably  Neuro: A+Ox3  HEENT: NC/AT, EOMI  Neck: Soft, supple  Cardio: RRR, nml S1/S2  Resp: Good effort, CTA b/l  Thorax: No chest wall tenderness  Breast: No lesions/masses, no drainage  GI/Abd: Soft, NT/ND, no rebound/guarding, no masses palpated  Vascular: All 4 extremities warm.  Skin: Intact, no breakdown  Lymphatic/Nodes: No palpable lymphadenopathy  Musculoskeletal: All 4 extremities moving spontaneously, no limitations  -------------------------------------------------------------------------------------------- Vitals:   T(C): 36.8 (10-11-19 @ 00:24), Max: 36.8 (10-11-19 @ 00:24)  HR: 70 (10-11-19 @ 00:24) (70 - 83)  BP: 136/77 (10-11-19 @ 00:24) (136/77 - 143/84)  RR: 19 (10-11-19 @ 00:24) (19 - 20)  SpO2: 94% (10-11-19 @ 00:24) (94% - 96%)  CAPILLARY BLOOD GLUCOSE      Height (cm): 165.1 (10-10 @ 18:54)  Weight (kg): 102.1 (10-10 @ 18:54)  BMI (kg/m2): 37.5 (10-10 @ 18:54)  BSA (m2): 2.08 (10-10 @ 18:54)      PHYSICAL EXAM:  General: NAD, Lying in bed comfortably  Neuro: A+Ox3  HEENT: NC/AT, EOMI, anicteric sclerae  Cardio: RRR, nml S1/S2  Resp: Good effort, CTA b/l  GI/Abd: Soft, NT/ND, no rebound/guarding, mild tenderness to deep palpation in LLQ, no masses palpated  Vascular: All 4 extremities warm.  Skin: Intact, no breakdown  Musculoskeletal: All 4 extremities moving spontaneously, no limitations  --------------------------------------------------------------------------------------------

## 2019-10-12 ENCOUNTER — TRANSCRIPTION ENCOUNTER (OUTPATIENT)
Age: 74
End: 2019-10-12

## 2019-10-12 VITALS
TEMPERATURE: 98 F | SYSTOLIC BLOOD PRESSURE: 142 MMHG | HEART RATE: 80 BPM | OXYGEN SATURATION: 96 % | DIASTOLIC BLOOD PRESSURE: 84 MMHG | RESPIRATION RATE: 18 BRPM

## 2019-10-12 PROCEDURE — 96374 THER/PROPH/DIAG INJ IV PUSH: CPT | Mod: XU

## 2019-10-12 PROCEDURE — 82435 ASSAY OF BLOOD CHLORIDE: CPT

## 2019-10-12 PROCEDURE — 83690 ASSAY OF LIPASE: CPT

## 2019-10-12 PROCEDURE — 86803 HEPATITIS C AB TEST: CPT

## 2019-10-12 PROCEDURE — 80048 BASIC METABOLIC PNL TOTAL CA: CPT

## 2019-10-12 PROCEDURE — 99285 EMERGENCY DEPT VISIT HI MDM: CPT | Mod: 25

## 2019-10-12 PROCEDURE — 81001 URINALYSIS AUTO W/SCOPE: CPT

## 2019-10-12 PROCEDURE — 85027 COMPLETE CBC AUTOMATED: CPT

## 2019-10-12 PROCEDURE — 80053 COMPREHEN METABOLIC PANEL: CPT

## 2019-10-12 PROCEDURE — 74176 CT ABD & PELVIS W/O CONTRAST: CPT

## 2019-10-12 PROCEDURE — 85014 HEMATOCRIT: CPT

## 2019-10-12 PROCEDURE — 82330 ASSAY OF CALCIUM: CPT

## 2019-10-12 PROCEDURE — 83605 ASSAY OF LACTIC ACID: CPT

## 2019-10-12 PROCEDURE — 84295 ASSAY OF SERUM SODIUM: CPT

## 2019-10-12 PROCEDURE — 84132 ASSAY OF SERUM POTASSIUM: CPT

## 2019-10-12 PROCEDURE — 83735 ASSAY OF MAGNESIUM: CPT

## 2019-10-12 PROCEDURE — 71045 X-RAY EXAM CHEST 1 VIEW: CPT

## 2019-10-12 PROCEDURE — 82947 ASSAY GLUCOSE BLOOD QUANT: CPT

## 2019-10-12 PROCEDURE — 84100 ASSAY OF PHOSPHORUS: CPT

## 2019-10-12 PROCEDURE — 82803 BLOOD GASES ANY COMBINATION: CPT

## 2019-10-12 RX ORDER — MOXIFLOXACIN HYDROCHLORIDE TABLETS, 400 MG 400 MG/1
1 TABLET, FILM COATED ORAL
Qty: 16 | Refills: 0
Start: 2019-10-12 | End: 2019-10-19

## 2019-10-12 RX ORDER — METRONIDAZOLE 500 MG
1 TABLET ORAL
Qty: 32 | Refills: 0
Start: 2019-10-12 | End: 2019-10-19

## 2019-10-12 RX ADMIN — Medication 100 MILLIGRAM(S): at 05:25

## 2019-10-12 RX ADMIN — Medication 200 MILLIGRAM(S): at 05:25

## 2019-10-12 NOTE — DISCHARGE NOTE PROVIDER - CARE PROVIDER_API CALL
Marshall Dey)  ColonRectal Surgery; Surgery  [address], [address]  [place] [address]  Phone: [phone]  Fax: [phone]  Follow Up Time: Routine

## 2019-10-12 NOTE — DISCHARGE NOTE NURSING/CASE MANAGEMENT/SOCIAL WORK - PATIENT PORTAL LINK FT
You can access the FollowMyHealth Patient Portal offered by [facility] by registering at the following website: [email or web address]. By joining [facility]’s FollowMyHealth portal, you will also be able to view your health information using other applications (apps) compatible with our system.

## 2019-10-12 NOTE — DISCHARGE NOTE PROVIDER - HOSPITAL COURSE
10/10: Ms. Linda Negron is a 74 year old F with a pmhx of HTN, HLD, T2DM, CVA, diverticulosis c/b 1 episode of diverticulitis in 2017 who presents to the ED with abdominal pain. The patient presented to the ED reporting that she has been having LLQ abdominal pain for the past week but worsened over the last day. She describes a sharp, constant, non-radiating LLQ pain similar to her last bout 2 years ago. She denies any associated fevers, chills, nausea, vomiting, or localizing signs. Her last BM was this morning and was soft, without blood.         Her last colonoscopy was earlier this year at an outside facility. Per the patient, findings were pertinent for diverticulosis and 1 polyp was found and removed.         10/11: Ines had return of bowel function in the late morning. Remained tender in the LLQ. She was given a clear liquid diet for lunch and tolerated it with no nausea or vomiting. She was then progressed to a regular diet after dinner, at which point she ate only a small amount, but again tolerated with no nausea or vomiting.         10/12: The patient continues to have bowel function. She has eaten a regular diet for both breakfast and lunch and reports no nausea or vomiting. She has some LLQ pain, particularly with bowel movements, but is only midly tender on physical exam.         At this time the patients obstruction has resolved. She is having consistent bowel movements, no nausea, and minimal pain. She is voiding with no frequency/urgency and ambulating without assistance. She is stable and ready for discharge.

## 2019-10-12 NOTE — DISCHARGE NOTE NURSING/CASE MANAGEMENT/SOCIAL WORK - NSDCPNINST_GEN_ALL_CORE
call MD // go to ER:  temperature, nausea, vomiting, abdominal pain; any symptoms that brought you to ER

## 2019-10-12 NOTE — PROGRESS NOTE ADULT - SUBJECTIVE AND OBJECTIVE BOX
COLORECTAL SURGERY DAILY PROGRESS NOTE:    Interval:  No acute events overnight.    Subjective:  Patient reports pain is well controlled. Denies N/V. Tolerating diet. Passing flatus, -BM. Ambulating independently and voiding spontaneously     Vital Signs Last 24 Hrs  T(C): 36.7 (12 Oct 2019 00:38), Max: 37.2 (11 Oct 2019 12:39)  T(F): 98.1 (12 Oct 2019 00:38), Max: 98.9 (11 Oct 2019 12:39)  HR: 81 (12 Oct 2019 00:38) (68 - 84)  BP: 120/69 (12 Oct 2019 00:38) (120/69 - 153/85)  BP(mean): --  RR: 18 (12 Oct 2019 00:38) (16 - 19)  SpO2: 97% (12 Oct 2019 00:38) (96% - 100%)    Exam:  Gen: NAD, resting in bed, alert and responding appropriately  Resp: Airway patent, non-labored respirations  Abd: Soft, ND, non-tender to deep palpation, no rebound or guarding.  Ext: No edema, WWP  Neuro: AAOx3, no focal deficits      LABS:                        12.3   8.62  )-----------( 334      ( 11 Oct 2019 09:12 )             38.2     10-11    141  |  105  |  10  ----------------------------<  112<H>  4.4   |  27  |  0.95    Ca    9.2      11 Oct 2019 09:12  Phos  3.1     10-11  Mg     1.8     10-11    TPro  7.0  /  Alb  3.7  /  TBili  0.2  /  DBili  x   /  AST  13  /  ALT  13  /  AlkPhos  72  10-10 COLORECTAL SURGERY DAILY PROGRESS NOTE:    Interval:  No acute events overnight.    Subjective:  Patient reports pain is well controlled. Denies N/V. Tolerating diet. Passing flatus, having BM. Ambulating independently and voiding spontaneously.    Objective:   Physical Exam:   Gen: NAD. Alert and cooperative.   Resp: No addition work of breathing.  Card: RRR. No peripheral edema or pallor.   Abd: Soft, ND, mildly tender in LLQ. No rebound or guarding   Ext: WWP. No significant deformity.       T(C): 36.7 (10-12-19 @ 08:48), Max: 37.2 (10-11-19 @ 12:39)  HR: 80 (10-12-19 @ 08:48) (74 - 84)  BP: 142/84 (10-12-19 @ 08:48) (120/69 - 147/75)  RR: 18 (10-12-19 @ 08:48) (16 - 18)  SpO2: 96% (10-12-19 @ 08:48) (95% - 100%)  Wt(kg): --    LABS:                        12.3   8.62  )-----------( 334      ( 11 Oct 2019 09:12 )             38.2     10-11    141  |  105  |  10  ----------------------------<  112<H>  4.4   |  27  |  0.95    Ca    9.2      11 Oct 2019 09:12  Phos  3.1     10-11  Mg     1.8     10-11    TPro  7.0  /  Alb  3.7  /  TBili  0.2  /  DBili  x   /  AST  13  /  ALT  13  /  AlkPhos  72  10-10      Urinalysis Basic - ( 10 Oct 2019 22:45 )    Color: Light Yellow / Appearance: Clear / S.016 / pH: x  Gluc: x / Ketone: Negative  / Bili: Negative / Urobili: Negative   Blood: x / Protein: Negative / Nitrite: Negative   Leuk Esterase: Small / RBC: 1 /hpf / WBC 2 /HPF   Sq Epi: x / Non Sq Epi: 0 /hpf / Bacteria: Negative      Is&O's    10-11-19 @ 07:01  -  10-12-19 @ 07:00  --------------------------------------------------------  IN: 2595 mL / OUT: 1700 mL / NET: 895 mL    10-12-19 @ 07:01  -  10-12-19 @ 11:59  --------------------------------------------------------  IN: 340 mL / OUT: 300 mL / NET: 40 mL        MEDS  ciprofloxacin   IVPB      ciprofloxacin   IVPB 400 milliGRAM(s) IV Intermittent every 12 hours  enoxaparin Injectable 40 milliGRAM(s) SubCutaneous daily  influenza   Vaccine 0.5 milliLiter(s) IntraMuscular once  metroNIDAZOLE  IVPB 500 milliGRAM(s) IV Intermittent every 8 hours  metroNIDAZOLE  IVPB

## 2019-10-12 NOTE — DISCHARGE NOTE NURSING/CASE MANAGEMENT/SOCIAL WORK - NSDCPEWEB_GEN_ALL_CORE
[facility] for Tobacco Control website --- [email or web address] --- [email or web address]

## 2019-10-12 NOTE — DISCHARGE NOTE NURSING/CASE MANAGEMENT/SOCIAL WORK - NSDCPEEMAIL_GEN_ALL_CORE
[facility] for Tobacco Control email [email or web address]

## 2019-10-12 NOTE — PROGRESS NOTE ADULT - ASSESSMENT
74 year old F HD1 p/w abdominal pain with a known history of diverticulosis and 1 prior episode of diverticulitis. Afebrile and HD stable. CT findings consistent with a Hinchey 1b diverticulitis. No emergent surgical intervention is warranted.    PLAN:  - C/w LRD  - IV abx  - No surgical intervention warranted at this time.  - DVT ppx  - If continues to tolerate diet and continues to have GI function D/C home today    Red surgery,  [id number] 74 year old F HD1 p/w abdominal pain with a known history of diverticulosis and 1 prior episode of diverticulitis. Afebrile and HD stable. CT findings consistent with a Hinchey 1b diverticulitis. No emergent surgical intervention is warranted.    PLAN:  - Diet: Advance to LRD  - IV abx  - No surgical intervention warranted at this time.  - DVT ppx  - If continues to tolerate diet and continues to have GI function D/C home today with PO abx    Red surgery,  [id number]

## 2019-10-15 PROBLEM — Z87.19 PERSONAL HISTORY OF OTHER DISEASES OF THE DIGESTIVE SYSTEM: Chronic | Status: ACTIVE | Noted: 2019-10-11

## 2019-11-15 ENCOUNTER — APPOINTMENT (OUTPATIENT)
Dept: GASTROENTEROLOGY | Facility: CLINIC | Age: 74
End: 2019-11-15
Payer: MEDICARE

## 2019-11-15 VITALS
SYSTOLIC BLOOD PRESSURE: 150 MMHG | BODY MASS INDEX: 36.32 KG/M2 | DIASTOLIC BLOOD PRESSURE: 100 MMHG | TEMPERATURE: 97.2 F | HEIGHT: 65 IN | WEIGHT: 218 LBS

## 2019-11-15 PROCEDURE — 99203 OFFICE O/P NEW LOW 30 MIN: CPT

## 2019-11-15 NOTE — REVIEW OF SYSTEMS
[As Noted in HPI] : as noted in HPI [Fever] : no fever [Chills] : no chills [Eye Pain] : no eye pain [Nosebleeds] : no nosebleeds [Chest Pain] : no chest pain [Shortness Of Breath] : no shortness of breath [Cough] : no cough [Dysuria] : no dysuria [Arthralgias] : no arthralgias [Joint Swelling] : no joint swelling [Itching] : no itching [Proptosis] : no proptosis [Easy Bleeding] : no tendency for easy bleeding [Easy Bruising] : no tendency for easy bruising

## 2019-11-15 NOTE — PHYSICAL EXAM
[General Appearance - In No Acute Distress] : in no acute distress [General Appearance - Alert] : alert [General Appearance - Well Nourished] : well nourished [Sclera] : the sclera and conjunctiva were normal [Respiration, Rhythm And Depth] : normal respiratory rhythm and effort [] : the neck was supple [Auscultation Breath Sounds / Voice Sounds] : lungs were clear to auscultation bilaterally [Heart Sounds] : normal S1 and S2 [Bowel Sounds] : normal bowel sounds [Abdomen Soft] : soft [Abnormal Walk] : normal gait [Abdomen Tenderness] : non-tender [Sensation] : the sensory exam was normal to light touch and pinprick [Motor Exam] : the motor exam was normal [Nail Clubbing] : no clubbing  or cyanosis of the fingernails [No Focal Deficits] : no focal deficits [Oriented To Time, Place, And Person] : oriented to person, place, and time [No CVA Tenderness] : no ~M costovertebral angle tenderness [FreeTextEntry1] : obese

## 2019-11-15 NOTE — HISTORY OF PRESENT ILLNESS
[FreeTextEntry1] : 73 yo female recently discharged from [facility] , with acute diverticulitis, ct scan microperforation of colon seen by surgery and discharged from hosptial after 2 days 10/12/2019. patient denies abodminal pain. \par normal bms, no brbpr, no melena.  intentional weight loss.\par \par last colonoscopy this year 01/2019 outside GI doctor. per patient one polyp removed.

## 2019-11-15 NOTE — ASSESSMENT
[FreeTextEntry1] : 1. h/o diverticulitis, clinically improved. pt had colonoscopy earlier this year.\par \par plan advise patient to obtain medical records and follow up with her Gastroenterologist/endoscopist\par           miralax as needed\par \par 2.obesity\par \par plan diet and exercise for weight loss.

## 2019-12-18 ENCOUNTER — INPATIENT (INPATIENT)
Facility: HOSPITAL | Age: 74
LOS: 3 days | Discharge: HOME | DRG: 392 | End: 2019-12-22
Attending: SURGERY | Admitting: SURGERY
Payer: MEDICARE

## 2019-12-18 VITALS
OXYGEN SATURATION: 95 % | HEART RATE: 79 BPM | RESPIRATION RATE: 18 BRPM | SYSTOLIC BLOOD PRESSURE: 151 MMHG | WEIGHT: 220.02 LBS | DIASTOLIC BLOOD PRESSURE: 86 MMHG | HEIGHT: 65 IN | TEMPERATURE: 98 F

## 2019-12-18 DIAGNOSIS — Z90.710 ACQUIRED ABSENCE OF BOTH CERVIX AND UTERUS: Chronic | ICD-10-CM

## 2019-12-18 DIAGNOSIS — K57.32 DIVERTICULITIS OF LARGE INTESTINE WITHOUT PERFORATION OR ABSCESS WITHOUT BLEEDING: ICD-10-CM

## 2019-12-18 DIAGNOSIS — Z90.89 ACQUIRED ABSENCE OF OTHER ORGANS: Chronic | ICD-10-CM

## 2019-12-18 DIAGNOSIS — E89.6 POSTPROCEDURAL ADRENOCORTICAL (-MEDULLARY) HYPOFUNCTION: Chronic | ICD-10-CM

## 2019-12-18 LAB
ALBUMIN SERPL ELPH-MCNC: 3.7 G/DL — SIGNIFICANT CHANGE UP (ref 3.3–5)
ALP SERPL-CCNC: 75 U/L — SIGNIFICANT CHANGE UP (ref 40–120)
ALT FLD-CCNC: 10 U/L — SIGNIFICANT CHANGE UP (ref 10–45)
ANION GAP SERPL CALC-SCNC: 16 MMOL/L — SIGNIFICANT CHANGE UP (ref 5–17)
APPEARANCE UR: CLEAR — SIGNIFICANT CHANGE UP
AST SERPL-CCNC: 24 U/L — SIGNIFICANT CHANGE UP (ref 10–40)
BASE EXCESS BLDV CALC-SCNC: 3.6 MMOL/L — HIGH (ref -2–2)
BASOPHILS # BLD AUTO: 0.07 K/UL — SIGNIFICANT CHANGE UP (ref 0–0.2)
BASOPHILS NFR BLD AUTO: 0.7 % — SIGNIFICANT CHANGE UP (ref 0–2)
BILIRUB SERPL-MCNC: 0.4 MG/DL — SIGNIFICANT CHANGE UP (ref 0.2–1.2)
BILIRUB UR-MCNC: NEGATIVE — SIGNIFICANT CHANGE UP
BUN SERPL-MCNC: 10 MG/DL — SIGNIFICANT CHANGE UP (ref 7–23)
CA-I SERPL-SCNC: 1.23 MMOL/L — SIGNIFICANT CHANGE UP (ref 1.12–1.3)
CALCIUM SERPL-MCNC: 9.5 MG/DL — SIGNIFICANT CHANGE UP (ref 8.4–10.5)
CHLORIDE BLDV-SCNC: 104 MMOL/L — SIGNIFICANT CHANGE UP (ref 96–108)
CHLORIDE SERPL-SCNC: 101 MMOL/L — SIGNIFICANT CHANGE UP (ref 96–108)
CO2 BLDV-SCNC: 32 MMOL/L — HIGH (ref 22–30)
CO2 SERPL-SCNC: 23 MMOL/L — SIGNIFICANT CHANGE UP (ref 22–31)
COLOR SPEC: YELLOW — SIGNIFICANT CHANGE UP
CREAT SERPL-MCNC: 0.88 MG/DL — SIGNIFICANT CHANGE UP (ref 0.5–1.3)
DIFF PNL FLD: NEGATIVE — SIGNIFICANT CHANGE UP
EOSINOPHIL # BLD AUTO: 0.16 K/UL — SIGNIFICANT CHANGE UP (ref 0–0.5)
EOSINOPHIL NFR BLD AUTO: 1.7 % — SIGNIFICANT CHANGE UP (ref 0–6)
GAS PNL BLDV: 140 MMOL/L — SIGNIFICANT CHANGE UP (ref 135–145)
GAS PNL BLDV: SIGNIFICANT CHANGE UP
GAS PNL BLDV: SIGNIFICANT CHANGE UP
GLUCOSE BLDC GLUCOMTR-MCNC: 84 MG/DL — SIGNIFICANT CHANGE UP (ref 70–99)
GLUCOSE BLDV-MCNC: 98 MG/DL — SIGNIFICANT CHANGE UP (ref 70–99)
GLUCOSE SERPL-MCNC: 102 MG/DL — HIGH (ref 70–99)
GLUCOSE UR QL: NEGATIVE — SIGNIFICANT CHANGE UP
HCO3 BLDV-SCNC: 30 MMOL/L — HIGH (ref 21–29)
HCT VFR BLD CALC: 43.1 % — SIGNIFICANT CHANGE UP (ref 34.5–45)
HCT VFR BLDA CALC: 43 % — SIGNIFICANT CHANGE UP (ref 39–50)
HGB BLD CALC-MCNC: 13.9 G/DL — SIGNIFICANT CHANGE UP (ref 11.5–15.5)
HGB BLD-MCNC: 13.5 G/DL — SIGNIFICANT CHANGE UP (ref 11.5–15.5)
IMM GRANULOCYTES NFR BLD AUTO: 0.8 % — SIGNIFICANT CHANGE UP (ref 0–1.5)
KETONES UR-MCNC: NEGATIVE — SIGNIFICANT CHANGE UP
LACTATE BLDV-MCNC: 1.7 MMOL/L — SIGNIFICANT CHANGE UP (ref 0.7–2)
LEUKOCYTE ESTERASE UR-ACNC: NEGATIVE — SIGNIFICANT CHANGE UP
LYMPHOCYTES # BLD AUTO: 1.52 K/UL — SIGNIFICANT CHANGE UP (ref 1–3.3)
LYMPHOCYTES # BLD AUTO: 16 % — SIGNIFICANT CHANGE UP (ref 13–44)
MCHC RBC-ENTMCNC: 26.8 PG — LOW (ref 27–34)
MCHC RBC-ENTMCNC: 31.3 GM/DL — LOW (ref 32–36)
MCV RBC AUTO: 85.5 FL — SIGNIFICANT CHANGE UP (ref 80–100)
MONOCYTES # BLD AUTO: 1.1 K/UL — HIGH (ref 0–0.9)
MONOCYTES NFR BLD AUTO: 11.6 % — SIGNIFICANT CHANGE UP (ref 2–14)
NEUTROPHILS # BLD AUTO: 6.56 K/UL — SIGNIFICANT CHANGE UP (ref 1.8–7.4)
NEUTROPHILS NFR BLD AUTO: 69.2 % — SIGNIFICANT CHANGE UP (ref 43–77)
NITRITE UR-MCNC: NEGATIVE — SIGNIFICANT CHANGE UP
NRBC # BLD: 0 /100 WBCS — SIGNIFICANT CHANGE UP (ref 0–0)
PCO2 BLDV: 56 MMHG — HIGH (ref 35–50)
PH BLDV: 7.35 — SIGNIFICANT CHANGE UP (ref 7.35–7.45)
PH UR: 6 — SIGNIFICANT CHANGE UP (ref 5–8)
PLATELET # BLD AUTO: 402 K/UL — HIGH (ref 150–400)
PO2 BLDV: 24 MMHG — LOW (ref 25–45)
POTASSIUM BLDV-SCNC: 4.2 MMOL/L — SIGNIFICANT CHANGE UP (ref 3.5–5.3)
POTASSIUM SERPL-MCNC: 4.1 MMOL/L — SIGNIFICANT CHANGE UP (ref 3.5–5.3)
POTASSIUM SERPL-SCNC: 4.1 MMOL/L — SIGNIFICANT CHANGE UP (ref 3.5–5.3)
PROT SERPL-MCNC: 7.8 G/DL — SIGNIFICANT CHANGE UP (ref 6–8.3)
PROT UR-MCNC: SIGNIFICANT CHANGE UP
RBC # BLD: 5.04 M/UL — SIGNIFICANT CHANGE UP (ref 3.8–5.2)
RBC # FLD: 14.7 % — HIGH (ref 10.3–14.5)
SAO2 % BLDV: 32 % — LOW (ref 67–88)
SODIUM SERPL-SCNC: 140 MMOL/L — SIGNIFICANT CHANGE UP (ref 135–145)
SP GR SPEC: 1.02 — SIGNIFICANT CHANGE UP (ref 1.01–1.02)
UROBILINOGEN FLD QL: NEGATIVE — SIGNIFICANT CHANGE UP
WBC # BLD: 9.49 K/UL — SIGNIFICANT CHANGE UP (ref 3.8–10.5)
WBC # FLD AUTO: 9.49 K/UL — SIGNIFICANT CHANGE UP (ref 3.8–10.5)

## 2019-12-18 PROCEDURE — 74177 CT ABD & PELVIS W/CONTRAST: CPT | Mod: 26

## 2019-12-18 PROCEDURE — 99285 EMERGENCY DEPT VISIT HI MDM: CPT

## 2019-12-18 RX ORDER — INFLUENZA VIRUS VACCINE 15; 15; 15; 15 UG/.5ML; UG/.5ML; UG/.5ML; UG/.5ML
0.5 SUSPENSION INTRAMUSCULAR ONCE
Refills: 0 | Status: DISCONTINUED | OUTPATIENT
Start: 2019-12-18 | End: 2019-12-22

## 2019-12-18 RX ORDER — SIMVASTATIN 20 MG/1
20 TABLET, FILM COATED ORAL AT BEDTIME
Refills: 0 | Status: DISCONTINUED | OUTPATIENT
Start: 2019-12-18 | End: 2019-12-22

## 2019-12-18 RX ORDER — FOLIC ACID 0.8 MG
1 TABLET ORAL
Qty: 0 | Refills: 0 | DISCHARGE

## 2019-12-18 RX ORDER — HYDROMORPHONE HYDROCHLORIDE 2 MG/ML
1 INJECTION INTRAMUSCULAR; INTRAVENOUS; SUBCUTANEOUS EVERY 4 HOURS
Refills: 0 | Status: DISCONTINUED | OUTPATIENT
Start: 2019-12-18 | End: 2019-12-22

## 2019-12-18 RX ORDER — FAMOTIDINE 10 MG/ML
20 INJECTION INTRAVENOUS ONCE
Refills: 0 | Status: COMPLETED | OUTPATIENT
Start: 2019-12-18 | End: 2019-12-18

## 2019-12-18 RX ORDER — CIPROFLOXACIN LACTATE 400MG/40ML
400 VIAL (ML) INTRAVENOUS EVERY 12 HOURS
Refills: 0 | Status: DISCONTINUED | OUTPATIENT
Start: 2019-12-19 | End: 2019-12-20

## 2019-12-18 RX ORDER — LOSARTAN POTASSIUM 100 MG/1
25 TABLET, FILM COATED ORAL DAILY
Refills: 0 | Status: DISCONTINUED | OUTPATIENT
Start: 2019-12-18 | End: 2019-12-22

## 2019-12-18 RX ORDER — ENOXAPARIN SODIUM 100 MG/ML
40 INJECTION SUBCUTANEOUS DAILY
Refills: 0 | Status: DISCONTINUED | OUTPATIENT
Start: 2019-12-18 | End: 2019-12-22

## 2019-12-18 RX ORDER — HYDROMORPHONE HYDROCHLORIDE 2 MG/ML
0.5 INJECTION INTRAMUSCULAR; INTRAVENOUS; SUBCUTANEOUS EVERY 4 HOURS
Refills: 0 | Status: DISCONTINUED | OUTPATIENT
Start: 2019-12-18 | End: 2019-12-22

## 2019-12-18 RX ORDER — PIPERACILLIN AND TAZOBACTAM 4; .5 G/20ML; G/20ML
3.38 INJECTION, POWDER, LYOPHILIZED, FOR SOLUTION INTRAVENOUS ONCE
Refills: 0 | Status: DISCONTINUED | OUTPATIENT
Start: 2019-12-18 | End: 2019-12-18

## 2019-12-18 RX ORDER — SODIUM CHLORIDE 9 MG/ML
1000 INJECTION, SOLUTION INTRAVENOUS
Refills: 0 | Status: DISCONTINUED | OUTPATIENT
Start: 2019-12-18 | End: 2019-12-19

## 2019-12-18 RX ORDER — ACETAMINOPHEN 500 MG
650 TABLET ORAL ONCE
Refills: 0 | Status: COMPLETED | OUTPATIENT
Start: 2019-12-18 | End: 2019-12-18

## 2019-12-18 RX ORDER — PIPERACILLIN AND TAZOBACTAM 4; .5 G/20ML; G/20ML
3.38 INJECTION, POWDER, LYOPHILIZED, FOR SOLUTION INTRAVENOUS ONCE
Refills: 0 | Status: COMPLETED | OUTPATIENT
Start: 2019-12-18 | End: 2019-12-18

## 2019-12-18 RX ORDER — METRONIDAZOLE 500 MG
500 TABLET ORAL EVERY 8 HOURS
Refills: 0 | Status: DISCONTINUED | OUTPATIENT
Start: 2019-12-19 | End: 2019-12-20

## 2019-12-18 RX ADMIN — SIMVASTATIN 20 MILLIGRAM(S): 20 TABLET, FILM COATED ORAL at 22:02

## 2019-12-18 RX ADMIN — Medication 650 MILLIGRAM(S): at 17:14

## 2019-12-18 RX ADMIN — ENOXAPARIN SODIUM 40 MILLIGRAM(S): 100 INJECTION SUBCUTANEOUS at 20:25

## 2019-12-18 RX ADMIN — PIPERACILLIN AND TAZOBACTAM 200 GRAM(S): 4; .5 INJECTION, POWDER, LYOPHILIZED, FOR SOLUTION INTRAVENOUS at 19:25

## 2019-12-18 RX ADMIN — FAMOTIDINE 20 MILLIGRAM(S): 10 INJECTION INTRAVENOUS at 17:14

## 2019-12-18 RX ADMIN — Medication 650 MILLIGRAM(S): at 19:00

## 2019-12-18 NOTE — H&P ADULT - NSHPPHYSICALEXAM_GEN_ALL_CORE
Gen: Well-developed, well-nourished in NAD  Neuro: AOx3  Pulm: CTAB, unlabored respiratory pattern   CV: RRR  Abd: Obese, soft, nondistended, LLQ mild tenderness. Classical infraumbilical vertical scar well-healed.   Ext: NUNEZ, no edema  Psych: appropriate affect

## 2019-12-18 NOTE — ED ADULT NURSE NOTE - NSIMPLEMENTINTERV_GEN_ALL_ED
Implemented All Universal Safety Interventions:  Artie to call system. Call bell, personal items and telephone within reach. Instruct patient to call for assistance. Room bathroom lighting operational. Non-slip footwear when patient is off stretcher. Physically safe environment: no spills, clutter or unnecessary equipment. Stretcher in lowest position, wheels locked, appropriate side rails in place.

## 2019-12-18 NOTE — ED PROVIDER NOTE - ATTENDING CONTRIBUTION TO CARE
I performed a history and physical exam of the patient and discussed their management with the resident/ACP. I reviewed the resident/ACP's note and agree with the documented findings and plan of care.

## 2019-12-18 NOTE — ED PROVIDER NOTE - CLINICAL SUMMARY MEDICAL DECISION MAKING FREE TEXT BOX
74F w/ history of complicated diverticulitis, now with LLQ pain similar to prior diverticulitis; r/o complicated diverticulitis again, r/o perforation, r/o UTI, r/o colitis; labs, imaging, pain management, re-evaluation

## 2019-12-18 NOTE — H&P ADULT - NSICDXPASTMEDICALHX_GEN_ALL_CORE_FT
PAST MEDICAL HISTORY:  Bone Spur right foot -current     CVA (Cerebral Infarction) s/p CVA 2007 - no residual symptoms     DM (Diabetes Mellitus)     Heart Murmur     History of diverticulitis     History of diverticulosis     HTN (Hypertension)     Hypercholesteremia     Scoliosis     Uterine Leiomyoma s/p supracervical hysterectomy; BSO 1992

## 2019-12-18 NOTE — ED PROVIDER NOTE - PSH
History of hysterectomy    History of partial adrenalectomy    History of tonsillectomy    S/P hysterectomy

## 2019-12-18 NOTE — ED PROVIDER NOTE - OBJECTIVE STATEMENT
74F w/ LLQ abdominal pain x 3 days, states that it feels similar to prior episodes of diverticulitis, moderate, non-radiating, exacerbated by palpation and bowel movements, denies alleviating factors. Associated with multiple episodes of non-bloody loose watery diarrhea, some nausea, but no vomiting, and subjective fever. Patient states that she has had complicated diverticulitis in the past before, with abscess but was never drained and treated with antibiotics.

## 2019-12-18 NOTE — ED PROVIDER NOTE - PHYSICAL EXAMINATION
PHYSICAL EXAMINATION:  VITALS REVIEWED.  VS hypertensive, otherwise normal, 95% on RA  GENERALIZED APPEARANCE:  Comfortable, no acute distress, lying in bed, ambulating without difficulty  SKIN:  Warm, dry, no cyanosis  HEAD:  No obvious scalp lesions  EYES:  Conjunctiva pink, no icterus  ENMT:  Mucus membranes moist, no stridor  NECK:  Supple, non-tender  CHEST AND RESPIRATORY:  Clear to auscultation B/L, good air entry B/L, equal chest expansion  HEART AND CARDIOVASCULAR:  Regular rate, no obvious murmur  ABDOMEN AND GI:  Soft, +LLQ tenderness, non-distended.  No rebound, no guarding, no CVA-area tenderness  EXTREMITIES:  No deformity, edema, or calf tenderness  NEURO: AAOx3, gross motor and sensory intact  PSYCH: Normal affect

## 2019-12-18 NOTE — ED PROVIDER NOTE - CARE PLAN
Principal Discharge DX:	Sigmoid diverticulitis  Goal:	Complicated sigmoid diverticulitis  Assessment and plan of treatment:	To be admitted to surgery, antibiotics provided.

## 2019-12-18 NOTE — ED PROVIDER NOTE - PMH
Bone Spur right foot -current    CVA (Cerebral Infarction) s/p CVA 2007 - no residual symptoms    DM (Diabetes Mellitus)    Heart Murmur    History of diverticulitis    History of diverticulosis    HTN (Hypertension)    Hypercholesteremia    Scoliosis    Uterine Leiomyoma s/p supracervical hysterectomy; BSO 1992

## 2019-12-18 NOTE — ED PROVIDER NOTE - RAPID ASSESSMENT
**Pt seen in waiting room by  Gaviota Ramos, documentation completed by Chad Jacinto. Pt to be sent to main ED for further evaluation - all orders placed to be followed by MD in the main ED**    75 y/o F pt presents to ED with LLQ abdominal pain x 3 days. Pt relates being here in october and having a hx of diverticulitis. Pt states she's had diarrhea all day yesterday with loose stools, nausea, and fever. Pt endorses that an abscess was found in her prior visit, but no drainage was done and she was only given abx to treat it. Pt denies vomiting, sick contacts, recent travels, and any other acute complaints. **Pt seen in waiting room by  Gaviota Ramos, documentation completed by Chad Jacinto. Pt to be sent to main ED for further evaluation - all orders placed to be followed by MD in the main ED**    75 y/o F pt presents to ED with LLQ abdominal pain x 3 days. Pt relates being here in october and having a hx of diverticulitis. Pt states she's had diarrhea all day yesterday with loose stools, nausea, and fever. Pt endorses that an abscess 2/2 diverticulitis was found in her prior visit, but no drainage was done and she was only given abx to treat it. Pt denies vomiting, sick contacts, recent travels, and any other acute complaints. + subjective fevers.

## 2019-12-18 NOTE — H&P ADULT - ASSESSMENT
73 yo woman presenting with LLQ pain, found to have recurrent Hinchey 1b diverticulitis    - NPO / IVF  - Cipro / Flagyl   - Outpatient colonoscopy, will discuss possible elective LAR    Discussed above with colorectal Surgery Attending, Dr. Mcdonald PGY4  [id number]

## 2019-12-18 NOTE — H&P ADULT - NSICDXPASTSURGICALHX_GEN_ALL_CORE_FT
PAST SURGICAL HISTORY:  History of hysterectomy     History of partial adrenalectomy     History of tonsillectomy     S/P hysterectomy

## 2019-12-18 NOTE — H&P ADULT - HISTORY OF PRESENT ILLNESS
75 yo woman with HTN, HLD, T2DM, prior classical hysterectomy, lap R adrenalectomy presenting with LLQ for the past three days. The pain is associated with some anorexia, and loose bowel movements. She denies nausea, vomiting, fevers or chills. Of note, patient was recently admitted to [facility] surgical service for Hinchey 1b diverticulitis (Oct 2019) managed non-operatively. This is her third episode of diverticulitis in recent years, her first episode was in 2017.     Her last colonoscopy was in January 2019, notable for diverticulosis and a single polypectomy. She has never seen a surgeon in the outpatient setting.

## 2019-12-18 NOTE — H&P ADULT - NSHPLABSRESULTS_GEN_ALL_CORE
13.5   9.49  )-----------( 402      ( 18 Dec 2019 15:32 )             43.1       12-18    140  |  101  |  10  ----------------------------<  102<H>  4.1   |  23  |  0.88    Ca    9.5      18 Dec 2019 15:32    TPro  7.8  /  Alb  3.7  /  TBili  0.4  /  DBili  x   /  AST  24  /  ALT  10  /  AlkPhos  75  12-18      < from: CT Abdomen and Pelvis w/ IV Cont (12.18.19 @ 17:46) >      EXAM:  CT ABDOMEN AND PELVIS IC                            PROCEDURE DATE:  12/18/2019            INTERPRETATION:  CLINICAL INFORMATION: Left lower quadrant abdominal pain    COMPARISON: CT abdomen pelvis 10/10/2019    PROCEDURE:   CT of the Abdomen and Pelvis was performed with intravenous contrast.   Intravenous contrast: 90 ml Omnipaque 350. 10 ml discarded.  Oral contrast: None.  Sagittal and coronal reformats were performed.    FINDINGS:    LOWER CHEST: Within normal limits.    LIVER: Multiple hepatic cysts  BILE DUCTS: Normal caliber.  GALLBLADDER: Within normal limits.  SPLEEN: Within normal limits.  PANCREAS: Within normal limits.  ADRENALS: Status post right adrenalectomy. Left adrenal gland within normal limits.  KIDNEYS/URETERS: Left parapelvic cyst. Left lower pole nonobstructive intrarenal calculus. No hydroureteronephrosis.    BLADDER: Within normal limits.  REPRODUCTIVE ORGANS: Hysterectomy.    BOWEL: Small hiatal hernia. Sigmoid diverticulitis with adjacent phlegmon and an interloop collection measuring approximately 3.0 x 3.0 x 2.8 cm (3, 80). No bowel obstruction. Appendix is normal. Small bowel is inseparable from the inflammatory changes adjacent to the sigmoid colon.  PERITONEUM: Inflammatory changes and fluid collection described above.  VESSELS: Calcified atherosclerosis.  RETROPERITONEUM/LYMPH NODES: No lymphadenopathy.    ABDOMINAL WALL: Mild diastases of the anterior abdominal wall containing fat. Postsurgical changes.  BONES: Spinal degenerative changes.    IMPRESSION:     Sigmoid diverticulitis with associated phlegmonous change and a fluid and air containing collection between small bowel and sigmoid colon now measuring up to 3.0 cm.                 JOHN CORRALES M.D., RADIOLOGY RESIDENT  This document has been electronically signed.  SUSI MICHAEL M.D., ATTENDING RADIOLOGIST  This document has been electronically signed. Dec 18 2019  6:16PM                < end of copied text >

## 2019-12-18 NOTE — ED ADULT NURSE NOTE - OBJECTIVE STATEMENT
Pt presents to ED awake and alert, accompanied by   c/o LLQ abdominal pain x 3 days. Pt states she had several episodes of loose stool yesterday. Feels like her past episodes of diverticulitis- last in October. Pt denies nausea, vomiting or fever. Did not take anything for pain at home. Respirations even and unlabored.

## 2019-12-19 LAB
ANION GAP SERPL CALC-SCNC: 16 MMOL/L — SIGNIFICANT CHANGE UP (ref 5–17)
ANION GAP SERPL CALC-SCNC: 16 MMOL/L — SIGNIFICANT CHANGE UP (ref 5–17)
BUN SERPL-MCNC: 6 MG/DL — LOW (ref 7–23)
BUN SERPL-MCNC: 9 MG/DL — SIGNIFICANT CHANGE UP (ref 7–23)
CALCIUM SERPL-MCNC: 5.8 MG/DL — CRITICAL LOW (ref 8.4–10.5)
CALCIUM SERPL-MCNC: 9.8 MG/DL — SIGNIFICANT CHANGE UP (ref 8.4–10.5)
CHLORIDE SERPL-SCNC: 100 MMOL/L — SIGNIFICANT CHANGE UP (ref 96–108)
CHLORIDE SERPL-SCNC: <70 MMOL/L — LOW (ref 96–108)
CO2 SERPL-SCNC: 16 MMOL/L — LOW (ref 22–31)
CO2 SERPL-SCNC: 27 MMOL/L — SIGNIFICANT CHANGE UP (ref 22–31)
CREAT SERPL-MCNC: 0.6 MG/DL — SIGNIFICANT CHANGE UP (ref 0.5–1.3)
CREAT SERPL-MCNC: 0.96 MG/DL — SIGNIFICANT CHANGE UP (ref 0.5–1.3)
CULTURE RESULTS: SIGNIFICANT CHANGE UP
GLUCOSE SERPL-MCNC: 102 MG/DL — HIGH (ref 70–99)
GLUCOSE SERPL-MCNC: 1523 MG/DL — CRITICAL HIGH (ref 70–99)
HBA1C BLD-MCNC: 6.5 % — HIGH (ref 4–5.6)
HCT VFR BLD CALC: 30.6 % — LOW (ref 34.5–45)
HCT VFR BLD CALC: 39.8 % — SIGNIFICANT CHANGE UP (ref 34.5–45)
HGB BLD-MCNC: 12.5 G/DL — SIGNIFICANT CHANGE UP (ref 11.5–15.5)
HGB BLD-MCNC: 7.1 G/DL — LOW (ref 11.5–15.5)
MAGNESIUM SERPL-MCNC: 1.2 MG/DL — LOW (ref 1.6–2.6)
MAGNESIUM SERPL-MCNC: 1.8 MG/DL — SIGNIFICANT CHANGE UP (ref 1.6–2.6)
MCHC RBC-ENTMCNC: 23.2 GM/DL — LOW (ref 32–36)
MCHC RBC-ENTMCNC: 27 PG — SIGNIFICANT CHANGE UP (ref 27–34)
MCHC RBC-ENTMCNC: 28.1 PG — SIGNIFICANT CHANGE UP (ref 27–34)
MCHC RBC-ENTMCNC: 31.4 GM/DL — LOW (ref 32–36)
MCV RBC AUTO: 120.9 FL — HIGH (ref 80–100)
MCV RBC AUTO: 86 FL — SIGNIFICANT CHANGE UP (ref 80–100)
NRBC # BLD: 0 /100 WBCS — SIGNIFICANT CHANGE UP (ref 0–0)
NRBC # BLD: 0 /100 WBCS — SIGNIFICANT CHANGE UP (ref 0–0)
PHOSPHATE SERPL-MCNC: 2.9 MG/DL — SIGNIFICANT CHANGE UP (ref 2.5–4.5)
PHOSPHATE SERPL-MCNC: 3 MG/DL — SIGNIFICANT CHANGE UP (ref 2.5–4.5)
PLATELET # BLD AUTO: 188 K/UL — SIGNIFICANT CHANGE UP (ref 150–400)
PLATELET # BLD AUTO: 328 K/UL — SIGNIFICANT CHANGE UP (ref 150–400)
POTASSIUM SERPL-MCNC: 4 MMOL/L — SIGNIFICANT CHANGE UP (ref 3.5–5.3)
POTASSIUM SERPL-MCNC: 4 MMOL/L — SIGNIFICANT CHANGE UP (ref 3.5–5.3)
POTASSIUM SERPL-SCNC: 4 MMOL/L — SIGNIFICANT CHANGE UP (ref 3.5–5.3)
POTASSIUM SERPL-SCNC: 4 MMOL/L — SIGNIFICANT CHANGE UP (ref 3.5–5.3)
RBC # BLD: 2.53 M/UL — LOW (ref 3.8–5.2)
RBC # BLD: 4.63 M/UL — SIGNIFICANT CHANGE UP (ref 3.8–5.2)
RBC # FLD: 14.6 % — HIGH (ref 10.3–14.5)
RBC # FLD: 16.7 % — HIGH (ref 10.3–14.5)
SODIUM SERPL-SCNC: 143 MMOL/L — SIGNIFICANT CHANGE UP (ref 135–145)
SODIUM SERPL-SCNC: 96 MMOL/L — CRITICAL LOW (ref 135–145)
SPECIMEN SOURCE: SIGNIFICANT CHANGE UP
WBC # BLD: 4.13 K/UL — SIGNIFICANT CHANGE UP (ref 3.8–10.5)
WBC # BLD: 7.35 K/UL — SIGNIFICANT CHANGE UP (ref 3.8–10.5)
WBC # FLD AUTO: 4.13 K/UL — SIGNIFICANT CHANGE UP (ref 3.8–10.5)
WBC # FLD AUTO: 7.35 K/UL — SIGNIFICANT CHANGE UP (ref 3.8–10.5)

## 2019-12-19 RX ORDER — DEXTROSE MONOHYDRATE, SODIUM CHLORIDE, AND POTASSIUM CHLORIDE 50; .745; 4.5 G/1000ML; G/1000ML; G/1000ML
1000 INJECTION, SOLUTION INTRAVENOUS
Refills: 0 | Status: DISCONTINUED | OUTPATIENT
Start: 2019-12-19 | End: 2019-12-22

## 2019-12-19 RX ADMIN — Medication 200 MILLIGRAM(S): at 17:34

## 2019-12-19 RX ADMIN — LOSARTAN POTASSIUM 25 MILLIGRAM(S): 100 TABLET, FILM COATED ORAL at 05:53

## 2019-12-19 RX ADMIN — SIMVASTATIN 20 MILLIGRAM(S): 20 TABLET, FILM COATED ORAL at 21:22

## 2019-12-19 RX ADMIN — Medication 200 MILLIGRAM(S): at 05:53

## 2019-12-19 RX ADMIN — ENOXAPARIN SODIUM 40 MILLIGRAM(S): 100 INJECTION SUBCUTANEOUS at 13:17

## 2019-12-19 RX ADMIN — DEXTROSE MONOHYDRATE, SODIUM CHLORIDE, AND POTASSIUM CHLORIDE 75 MILLILITER(S): 50; .745; 4.5 INJECTION, SOLUTION INTRAVENOUS at 15:35

## 2019-12-19 RX ADMIN — Medication 100 MILLIGRAM(S): at 21:22

## 2019-12-19 RX ADMIN — Medication 100 MILLIGRAM(S): at 05:53

## 2019-12-19 RX ADMIN — Medication 100 MILLIGRAM(S): at 13:18

## 2019-12-19 NOTE — PROGRESS NOTE ADULT - SUBJECTIVE AND OBJECTIVE BOX
RED SURGERY DAILY PROGRESS NOTE:       SUBJECTIVE/ROS: Patient seen and examined at bedside.  Patient states her pain has slightly improved.  She denies any nausea/vomiting.         MEDICATIONS  (STANDING):  ciprofloxacin   IVPB 400 milliGRAM(s) IV Intermittent every 12 hours  enoxaparin Injectable 40 milliGRAM(s) SubCutaneous daily  influenza   Vaccine 0.5 milliLiter(s) IntraMuscular once  lactated ringers. 1000 milliLiter(s) (84 mL/Hr) IV Continuous <Continuous>  losartan 25 milliGRAM(s) Oral daily  metroNIDAZOLE  IVPB 500 milliGRAM(s) IV Intermittent every 8 hours  simvastatin 20 milliGRAM(s) Oral at bedtime    MEDICATIONS  (PRN):  HYDROmorphone  Injectable 0.5 milliGRAM(s) IV Push every 4 hours PRN Moderate Pain (4 - 6)  HYDROmorphone  Injectable 1 milliGRAM(s) IV Push every 4 hours PRN Severe Pain (7 - 10)      OBJECTIVE:    Vital Signs Last 24 Hrs  T(C): 36.4 (19 Dec 2019 10:33), Max: 36.9 (19 Dec 2019 05:59)  T(F): 97.5 (19 Dec 2019 10:33), Max: 98.5 (19 Dec 2019 05:59)  HR: 72 (19 Dec 2019 10:33) (72 - 87)  BP: 140/74 (19 Dec 2019 10:33) (139/85 - 167/58)  BP(mean): --  RR: 16 (19 Dec 2019 10:33) (16 - 20)  SpO2: 97% (19 Dec 2019 10:33) (95% - 100%)        I&O's Detail    18 Dec 2019 07:01  -  19 Dec 2019 07:00  --------------------------------------------------------  IN:    lactated ringers.: 1500 mL  Total IN: 1500 mL    OUT:    Voided: 750 mL  Total OUT: 750 mL    Total NET: 750 mL      19 Dec 2019 07:01  -  19 Dec 2019 12:07  --------------------------------------------------------  IN:    lactated ringers.: 420 mL  Total IN: 420 mL    OUT:    Voided: 300 mL  Total OUT: 300 mL    Total NET: 120 mL          Daily Height in cm: 165.1 (18 Dec 2019 12:33)    Daily     LABS:                        7.1    4.13  )-----------( 188      ( 19 Dec 2019 07:16 )             30.6         143  |  100  |  9   ----------------------------<  102<H>  4.0   |  27  |  0.96    Ca    9.8      19 Dec 2019 11:05  Phos  2.9       Mg     1.2         TPro  7.8  /  Alb  3.7  /  TBili  0.4  /  DBili  x   /  AST  24  /  ALT  10  /  AlkPhos  75        Urinalysis Basic - ( 18 Dec 2019 17:25 )    Color: Yellow / Appearance: Clear / S.023 / pH: x  Gluc: x / Ketone: Negative  / Bili: Negative / Urobili: Negative   Blood: x / Protein: Trace / Nitrite: Negative   Leuk Esterase: Negative / RBC: x / WBC x   Sq Epi: x / Non Sq Epi: x / Bacteria: x                PHYSICAL EXAM:  Constitutional: well developed, well nourished, NAD  Respiratory: No increased WOB  Abdomen: soft, minimally distended with mild LLQ tenderness to palpation.  No rebound tenderness or guarding.  Psych: A&Ox3

## 2019-12-19 NOTE — PROGRESS NOTE ADULT - ASSESSMENT
73 yo woman presenting with LLQ pain, found to have recurrent Hinchey 1b diverticulitis    - NPO / IVF --> will consider CLD later today if pain improved  - Cipro / Flagyl   - oob/encourage ambulation  - DVT ppx  - Outpatient colonoscopy, will discuss possible elective LAR      RED [phone]

## 2019-12-20 LAB
ANION GAP SERPL CALC-SCNC: 12 MMOL/L — SIGNIFICANT CHANGE UP (ref 5–17)
BUN SERPL-MCNC: 6 MG/DL — LOW (ref 7–23)
CALCIUM SERPL-MCNC: 9 MG/DL — SIGNIFICANT CHANGE UP (ref 8.4–10.5)
CHLORIDE SERPL-SCNC: 103 MMOL/L — SIGNIFICANT CHANGE UP (ref 96–108)
CO2 SERPL-SCNC: 22 MMOL/L — SIGNIFICANT CHANGE UP (ref 22–31)
CREAT SERPL-MCNC: 0.93 MG/DL — SIGNIFICANT CHANGE UP (ref 0.5–1.3)
GLUCOSE SERPL-MCNC: 131 MG/DL — HIGH (ref 70–99)
HCT VFR BLD CALC: 39.4 % — SIGNIFICANT CHANGE UP (ref 34.5–45)
HGB BLD-MCNC: 12.5 G/DL — SIGNIFICANT CHANGE UP (ref 11.5–15.5)
MAGNESIUM SERPL-MCNC: 1.7 MG/DL — SIGNIFICANT CHANGE UP (ref 1.6–2.6)
MCHC RBC-ENTMCNC: 27.5 PG — SIGNIFICANT CHANGE UP (ref 27–34)
MCHC RBC-ENTMCNC: 31.7 GM/DL — LOW (ref 32–36)
MCV RBC AUTO: 86.8 FL — SIGNIFICANT CHANGE UP (ref 80–100)
NRBC # BLD: 0 /100 WBCS — SIGNIFICANT CHANGE UP (ref 0–0)
PHOSPHATE SERPL-MCNC: 3 MG/DL — SIGNIFICANT CHANGE UP (ref 2.5–4.5)
PLATELET # BLD AUTO: 235 K/UL — SIGNIFICANT CHANGE UP (ref 150–400)
POTASSIUM SERPL-MCNC: 4.1 MMOL/L — SIGNIFICANT CHANGE UP (ref 3.5–5.3)
POTASSIUM SERPL-SCNC: 4.1 MMOL/L — SIGNIFICANT CHANGE UP (ref 3.5–5.3)
RBC # BLD: 4.54 M/UL — SIGNIFICANT CHANGE UP (ref 3.8–5.2)
RBC # FLD: 14.7 % — HIGH (ref 10.3–14.5)
SODIUM SERPL-SCNC: 137 MMOL/L — SIGNIFICANT CHANGE UP (ref 135–145)
WBC # BLD: 5.64 K/UL — SIGNIFICANT CHANGE UP (ref 3.8–10.5)
WBC # FLD AUTO: 5.64 K/UL — SIGNIFICANT CHANGE UP (ref 3.8–10.5)

## 2019-12-20 RX ORDER — PIPERACILLIN AND TAZOBACTAM 4; .5 G/20ML; G/20ML
3.38 INJECTION, POWDER, LYOPHILIZED, FOR SOLUTION INTRAVENOUS ONCE
Refills: 0 | Status: COMPLETED | OUTPATIENT
Start: 2019-12-20 | End: 2019-12-20

## 2019-12-20 RX ORDER — ONDANSETRON 8 MG/1
4 TABLET, FILM COATED ORAL ONCE
Refills: 0 | Status: COMPLETED | OUTPATIENT
Start: 2019-12-20 | End: 2019-12-20

## 2019-12-20 RX ORDER — PIPERACILLIN AND TAZOBACTAM 4; .5 G/20ML; G/20ML
3.38 INJECTION, POWDER, LYOPHILIZED, FOR SOLUTION INTRAVENOUS EVERY 8 HOURS
Refills: 0 | Status: DISCONTINUED | OUTPATIENT
Start: 2019-12-20 | End: 2019-12-22

## 2019-12-20 RX ADMIN — ONDANSETRON 4 MILLIGRAM(S): 8 TABLET, FILM COATED ORAL at 17:08

## 2019-12-20 RX ADMIN — PIPERACILLIN AND TAZOBACTAM 200 GRAM(S): 4; .5 INJECTION, POWDER, LYOPHILIZED, FOR SOLUTION INTRAVENOUS at 09:24

## 2019-12-20 RX ADMIN — LOSARTAN POTASSIUM 25 MILLIGRAM(S): 100 TABLET, FILM COATED ORAL at 05:55

## 2019-12-20 RX ADMIN — ENOXAPARIN SODIUM 40 MILLIGRAM(S): 100 INJECTION SUBCUTANEOUS at 12:10

## 2019-12-20 RX ADMIN — PIPERACILLIN AND TAZOBACTAM 25 GRAM(S): 4; .5 INJECTION, POWDER, LYOPHILIZED, FOR SOLUTION INTRAVENOUS at 17:12

## 2019-12-20 RX ADMIN — Medication 100 MILLIGRAM(S): at 05:56

## 2019-12-20 RX ADMIN — Medication 200 MILLIGRAM(S): at 05:55

## 2019-12-21 LAB
ANION GAP SERPL CALC-SCNC: 8 MMOL/L — SIGNIFICANT CHANGE UP (ref 5–17)
BUN SERPL-MCNC: 4 MG/DL — LOW (ref 7–23)
CALCIUM SERPL-MCNC: 9.1 MG/DL — SIGNIFICANT CHANGE UP (ref 8.4–10.5)
CHLORIDE SERPL-SCNC: 104 MMOL/L — SIGNIFICANT CHANGE UP (ref 96–108)
CO2 SERPL-SCNC: 25 MMOL/L — SIGNIFICANT CHANGE UP (ref 22–31)
CREAT SERPL-MCNC: 1.1 MG/DL — SIGNIFICANT CHANGE UP (ref 0.5–1.3)
GLUCOSE SERPL-MCNC: 148 MG/DL — HIGH (ref 70–99)
HCT VFR BLD CALC: 37.9 % — SIGNIFICANT CHANGE UP (ref 34.5–45)
HGB BLD-MCNC: 11.6 G/DL — SIGNIFICANT CHANGE UP (ref 11.5–15.5)
MAGNESIUM SERPL-MCNC: 1.8 MG/DL — SIGNIFICANT CHANGE UP (ref 1.6–2.6)
MCHC RBC-ENTMCNC: 26.4 PG — LOW (ref 27–34)
MCHC RBC-ENTMCNC: 30.6 GM/DL — LOW (ref 32–36)
MCV RBC AUTO: 86.1 FL — SIGNIFICANT CHANGE UP (ref 80–100)
NRBC # BLD: 0 /100 WBCS — SIGNIFICANT CHANGE UP (ref 0–0)
PHOSPHATE SERPL-MCNC: 3.3 MG/DL — SIGNIFICANT CHANGE UP (ref 2.5–4.5)
PLATELET # BLD AUTO: 328 K/UL — SIGNIFICANT CHANGE UP (ref 150–400)
POTASSIUM SERPL-MCNC: 4 MMOL/L — SIGNIFICANT CHANGE UP (ref 3.5–5.3)
POTASSIUM SERPL-SCNC: 4 MMOL/L — SIGNIFICANT CHANGE UP (ref 3.5–5.3)
RBC # BLD: 4.4 M/UL — SIGNIFICANT CHANGE UP (ref 3.8–5.2)
RBC # FLD: 14.6 % — HIGH (ref 10.3–14.5)
SODIUM SERPL-SCNC: 137 MMOL/L — SIGNIFICANT CHANGE UP (ref 135–145)
WBC # BLD: 5.27 K/UL — SIGNIFICANT CHANGE UP (ref 3.8–10.5)
WBC # FLD AUTO: 5.27 K/UL — SIGNIFICANT CHANGE UP (ref 3.8–10.5)

## 2019-12-21 RX ORDER — ACETAMINOPHEN 500 MG
650 TABLET ORAL ONCE
Refills: 0 | Status: COMPLETED | OUTPATIENT
Start: 2019-12-21 | End: 2019-12-21

## 2019-12-21 RX ORDER — MAGNESIUM SULFATE 500 MG/ML
2 VIAL (ML) INJECTION ONCE
Refills: 0 | Status: COMPLETED | OUTPATIENT
Start: 2019-12-21 | End: 2019-12-21

## 2019-12-21 RX ADMIN — PIPERACILLIN AND TAZOBACTAM 25 GRAM(S): 4; .5 INJECTION, POWDER, LYOPHILIZED, FOR SOLUTION INTRAVENOUS at 00:30

## 2019-12-21 RX ADMIN — DEXTROSE MONOHYDRATE, SODIUM CHLORIDE, AND POTASSIUM CHLORIDE 100 MILLILITER(S): 50; .745; 4.5 INJECTION, SOLUTION INTRAVENOUS at 05:30

## 2019-12-21 RX ADMIN — PIPERACILLIN AND TAZOBACTAM 25 GRAM(S): 4; .5 INJECTION, POWDER, LYOPHILIZED, FOR SOLUTION INTRAVENOUS at 18:40

## 2019-12-21 RX ADMIN — HYDROMORPHONE HYDROCHLORIDE 1 MILLIGRAM(S): 2 INJECTION INTRAMUSCULAR; INTRAVENOUS; SUBCUTANEOUS at 05:29

## 2019-12-21 RX ADMIN — LOSARTAN POTASSIUM 25 MILLIGRAM(S): 100 TABLET, FILM COATED ORAL at 05:29

## 2019-12-21 RX ADMIN — Medication 650 MILLIGRAM(S): at 23:00

## 2019-12-21 RX ADMIN — ENOXAPARIN SODIUM 40 MILLIGRAM(S): 100 INJECTION SUBCUTANEOUS at 11:46

## 2019-12-21 RX ADMIN — SIMVASTATIN 20 MILLIGRAM(S): 20 TABLET, FILM COATED ORAL at 00:30

## 2019-12-21 RX ADMIN — Medication 650 MILLIGRAM(S): at 22:24

## 2019-12-21 RX ADMIN — PIPERACILLIN AND TAZOBACTAM 25 GRAM(S): 4; .5 INJECTION, POWDER, LYOPHILIZED, FOR SOLUTION INTRAVENOUS at 08:31

## 2019-12-21 RX ADMIN — Medication 50 GRAM(S): at 13:06

## 2019-12-21 RX ADMIN — HYDROMORPHONE HYDROCHLORIDE 1 MILLIGRAM(S): 2 INJECTION INTRAMUSCULAR; INTRAVENOUS; SUBCUTANEOUS at 06:00

## 2019-12-21 RX ADMIN — SIMVASTATIN 20 MILLIGRAM(S): 20 TABLET, FILM COATED ORAL at 22:24

## 2019-12-21 NOTE — PROGRESS NOTE ADULT - SUBJECTIVE AND OBJECTIVE BOX
Surgery Progress Note  Patient is a 74y old  Female who presents with a chief complaint of Abdominal pain (21 Dec 2019 09:58)      SUBJECTIVE: Patient seen and examined at bedside with surgical team.   Has nausea over night that resolved without intervention.   Abdominal pain has resolved. States shes is hungry.       Vital Signs Last 24 Hrs  T(C): 36.6 (21 Dec 2019 08:44), Max: 37.5 (20 Dec 2019 22:31)  T(F): 97.9 (21 Dec 2019 08:44), Max: 99.5 (20 Dec 2019 22:31)  HR: 66 (21 Dec 2019 08:44) (66 - 89)  BP: 146/86 (21 Dec 2019 08:44) (133/79 - 165/92)  BP(mean): --  RR: 18 (21 Dec 2019 08:44) (17 - 18)  SpO2: 95% (21 Dec 2019 08:44) (95% - 98%)    Physical Exam  Constitutional: NAD  Respiratory: breathing comfortably on RA  Abd: soft, NT, ND   Ext: moving all 4 ext spontaneously     I&O's Detail    20 Dec 2019 07:01  -  21 Dec 2019 07:00  --------------------------------------------------------  IN:    dextrose 5% + sodium chloride 0.45% with potassium chloride 20 mEq/L: 2200 mL    IV PiggyBack: 100 mL    Oral Fluid: 240 mL  Total IN: 2540 mL    OUT:    Voided: 200 mL  Total OUT: 200 mL    Total NET: 2340 mL      MEDICATIONS  (STANDING):  dextrose 5% + sodium chloride 0.45% with potassium chloride 20 mEq/L 1000 milliLiter(s) (100 mL/Hr) IV Continuous <Continuous>  enoxaparin Injectable 40 milliGRAM(s) SubCutaneous daily  influenza   Vaccine 0.5 milliLiter(s) IntraMuscular once  losartan 25 milliGRAM(s) Oral daily  piperacillin/tazobactam IVPB.. 3.375 Gram(s) IV Intermittent every 8 hours  simvastatin 20 milliGRAM(s) Oral at bedtime    MEDICATIONS  (PRN):  HYDROmorphone  Injectable 0.5 milliGRAM(s) IV Push every 4 hours PRN Moderate Pain (4 - 6)  HYDROmorphone  Injectable 1 milliGRAM(s) IV Push every 4 hours PRN Severe Pain (7 - 10)      LABS:                        11.6   5.27  )-----------( 328      ( 21 Dec 2019 07:09 )             37.9     12-21    137  |  104  |  4<L>  ----------------------------<  148<H>  4.0   |  25  |  1.10    Ca    9.1      21 Dec 2019 07:09  Phos  3.3     12-21  Mg     1.8     12-21

## 2019-12-21 NOTE — PROGRESS NOTE ADULT - SUBJECTIVE AND OBJECTIVE BOX
INTERVAL HPI/OVERNIGHT EVENTS:  Patient is a 74yFemale  Pt feels better, no abd pain    Vital Signs Last 24 Hrs  T(C): 36.6 (21 Dec 2019 08:44), Max: 37.5 (20 Dec 2019 22:31)  T(F): 97.9 (21 Dec 2019 08:44), Max: 99.5 (20 Dec 2019 22:31)  HR: 66 (21 Dec 2019 08:44) (66 - 89)  BP: 146/86 (21 Dec 2019 08:44) (133/79 - 165/92)  BP(mean): --  RR: 18 (21 Dec 2019 08:44) (17 - 18)  SpO2: 95% (21 Dec 2019 08:44) (95% - 98%)  12-20 @ 07:01  -  12-21 @ 07:00  --------------------------------------------------------  IN: 2540 mL / OUT: 200 mL / NET: 2340 mL        PHYSICAL EXAM:  Constitutional: well developed, well nourished, NAD  Eyes: anicteric  ENMT: normal facies, symmetric  Neck: supple  Respiratory: CTA bilat  Cardiovascular: RRR  Gastrointestinal: abdomen soft, nontender, nondistended. No obvious masses. No peritonitis  Extremities: FROM, warm  Neurological: intact, non-focal  Skin: no gross lesions  Lymph Nodes: no gross adenopathy  Musculoskeletal: equal strength bilateral upper and lower extremities  Psychiatric: oriented x 3; appropriate          LABS:                        11.6   5.27  )-----------( 328      ( 21 Dec 2019 07:09 )             37.9     12-21    137  |  104  |  4<L>  ----------------------------<  148<H>  4.0   |  25  |  1.10    Ca    9.1      21 Dec 2019 07:09  Phos  3.3     12-21  Mg     1.8     12-21          Magnesium, Serum: 1.8 mg/dL (12-21 @ 07:09)  Phosphorus Level, Serum: 3.3 mg/dL (12-21 @ 07:09)

## 2019-12-22 ENCOUNTER — TRANSCRIPTION ENCOUNTER (OUTPATIENT)
Age: 74
End: 2019-12-22

## 2019-12-22 VITALS
HEART RATE: 83 BPM | RESPIRATION RATE: 18 BRPM | SYSTOLIC BLOOD PRESSURE: 130 MMHG | TEMPERATURE: 98 F | DIASTOLIC BLOOD PRESSURE: 78 MMHG | OXYGEN SATURATION: 95 %

## 2019-12-22 LAB
ANION GAP SERPL CALC-SCNC: 10 MMOL/L — SIGNIFICANT CHANGE UP (ref 5–17)
BUN SERPL-MCNC: <4 MG/DL — LOW (ref 7–23)
CALCIUM SERPL-MCNC: 9 MG/DL — SIGNIFICANT CHANGE UP (ref 8.4–10.5)
CHLORIDE SERPL-SCNC: 103 MMOL/L — SIGNIFICANT CHANGE UP (ref 96–108)
CO2 SERPL-SCNC: 26 MMOL/L — SIGNIFICANT CHANGE UP (ref 22–31)
CREAT SERPL-MCNC: 1.15 MG/DL — SIGNIFICANT CHANGE UP (ref 0.5–1.3)
GLUCOSE SERPL-MCNC: 131 MG/DL — HIGH (ref 70–99)
HCT VFR BLD CALC: 37.3 % — SIGNIFICANT CHANGE UP (ref 34.5–45)
HGB BLD-MCNC: 11.6 G/DL — SIGNIFICANT CHANGE UP (ref 11.5–15.5)
MAGNESIUM SERPL-MCNC: 2.2 MG/DL — SIGNIFICANT CHANGE UP (ref 1.6–2.6)
MCHC RBC-ENTMCNC: 26.7 PG — LOW (ref 27–34)
MCHC RBC-ENTMCNC: 31.1 GM/DL — LOW (ref 32–36)
MCV RBC AUTO: 85.7 FL — SIGNIFICANT CHANGE UP (ref 80–100)
NRBC # BLD: 0 /100 WBCS — SIGNIFICANT CHANGE UP (ref 0–0)
PHOSPHATE SERPL-MCNC: 3.1 MG/DL — SIGNIFICANT CHANGE UP (ref 2.5–4.5)
PLATELET # BLD AUTO: 332 K/UL — SIGNIFICANT CHANGE UP (ref 150–400)
POTASSIUM SERPL-MCNC: 4.2 MMOL/L — SIGNIFICANT CHANGE UP (ref 3.5–5.3)
POTASSIUM SERPL-SCNC: 4.2 MMOL/L — SIGNIFICANT CHANGE UP (ref 3.5–5.3)
RBC # BLD: 4.35 M/UL — SIGNIFICANT CHANGE UP (ref 3.8–5.2)
RBC # FLD: 14.6 % — HIGH (ref 10.3–14.5)
SODIUM SERPL-SCNC: 139 MMOL/L — SIGNIFICANT CHANGE UP (ref 135–145)
WBC # BLD: 6.36 K/UL — SIGNIFICANT CHANGE UP (ref 3.8–10.5)
WBC # FLD AUTO: 6.36 K/UL — SIGNIFICANT CHANGE UP (ref 3.8–10.5)

## 2019-12-22 PROCEDURE — 81003 URINALYSIS AUTO W/O SCOPE: CPT

## 2019-12-22 PROCEDURE — 85027 COMPLETE CBC AUTOMATED: CPT

## 2019-12-22 PROCEDURE — 84100 ASSAY OF PHOSPHORUS: CPT

## 2019-12-22 PROCEDURE — 99285 EMERGENCY DEPT VISIT HI MDM: CPT | Mod: 25

## 2019-12-22 PROCEDURE — 82962 GLUCOSE BLOOD TEST: CPT

## 2019-12-22 PROCEDURE — 82947 ASSAY GLUCOSE BLOOD QUANT: CPT

## 2019-12-22 PROCEDURE — 83735 ASSAY OF MAGNESIUM: CPT

## 2019-12-22 PROCEDURE — 96375 TX/PRO/DX INJ NEW DRUG ADDON: CPT

## 2019-12-22 PROCEDURE — 80053 COMPREHEN METABOLIC PANEL: CPT

## 2019-12-22 PROCEDURE — 82435 ASSAY OF BLOOD CHLORIDE: CPT

## 2019-12-22 PROCEDURE — 82803 BLOOD GASES ANY COMBINATION: CPT

## 2019-12-22 PROCEDURE — 87086 URINE CULTURE/COLONY COUNT: CPT

## 2019-12-22 PROCEDURE — 74177 CT ABD & PELVIS W/CONTRAST: CPT

## 2019-12-22 PROCEDURE — 83605 ASSAY OF LACTIC ACID: CPT

## 2019-12-22 PROCEDURE — 84295 ASSAY OF SERUM SODIUM: CPT

## 2019-12-22 PROCEDURE — 82330 ASSAY OF CALCIUM: CPT

## 2019-12-22 PROCEDURE — 85014 HEMATOCRIT: CPT

## 2019-12-22 PROCEDURE — 80048 BASIC METABOLIC PNL TOTAL CA: CPT

## 2019-12-22 PROCEDURE — 96374 THER/PROPH/DIAG INJ IV PUSH: CPT | Mod: XU

## 2019-12-22 PROCEDURE — 83036 HEMOGLOBIN GLYCOSYLATED A1C: CPT

## 2019-12-22 PROCEDURE — 84132 ASSAY OF SERUM POTASSIUM: CPT

## 2019-12-22 RX ADMIN — ENOXAPARIN SODIUM 40 MILLIGRAM(S): 100 INJECTION SUBCUTANEOUS at 12:23

## 2019-12-22 RX ADMIN — LOSARTAN POTASSIUM 25 MILLIGRAM(S): 100 TABLET, FILM COATED ORAL at 05:20

## 2019-12-22 RX ADMIN — PIPERACILLIN AND TAZOBACTAM 25 GRAM(S): 4; .5 INJECTION, POWDER, LYOPHILIZED, FOR SOLUTION INTRAVENOUS at 09:52

## 2019-12-22 RX ADMIN — DEXTROSE MONOHYDRATE, SODIUM CHLORIDE, AND POTASSIUM CHLORIDE 100 MILLILITER(S): 50; .745; 4.5 INJECTION, SOLUTION INTRAVENOUS at 09:52

## 2019-12-22 RX ADMIN — PIPERACILLIN AND TAZOBACTAM 25 GRAM(S): 4; .5 INJECTION, POWDER, LYOPHILIZED, FOR SOLUTION INTRAVENOUS at 01:14

## 2019-12-22 NOTE — PROGRESS NOTE ADULT - SUBJECTIVE AND OBJECTIVE BOX
INTERVAL HPI/OVERNIGHT EVENTS:  Patient is a 74yFemale  senthil diet, no pain    Vital Signs Last 24 Hrs  T(C): 36.4 (22 Dec 2019 05:18), Max: 36.9 (21 Dec 2019 21:36)  T(F): 97.5 (22 Dec 2019 05:18), Max: 98.5 (21 Dec 2019 21:36)  HR: 64 (22 Dec 2019 05:18) (64 - 80)  BP: 128/78 (22 Dec 2019 05:18) (127/67 - 162/91)  BP(mean): --  RR: 18 (22 Dec 2019 05:18) (18 - 18)  SpO2: 95% (22 Dec 2019 05:18) (95% - 97%)  12-21 @ 07:01  -  12-22 @ 07:00  --------------------------------------------------------  IN: 1970 mL / OUT: 350 mL / NET: 1620 mL    12-22 @ 07:01  -  12-22 @ 10:13  --------------------------------------------------------  IN: 700 mL / OUT: 400 mL / NET: 300 mL        PHYSICAL EXAM:  Constitutional: well developed, well nourished, NAD  Eyes: anicteric  ENMT: normal facies, symmetric  Neck: supple  Respiratory: CTA bilat  Cardiovascular: RRR  Gastrointestinal: abdomen soft, nontender, nondistended. No obvious masses. No peritonitis  Extremities: FROM, warm  Neurological: intact, non-focal  Skin: no gross lesions  Lymph Nodes: no gross adenopathy  Musculoskeletal: equal strength bilateral upper and lower extremities  Psychiatric: oriented x 3; appropriate          LABS:                        11.6   6.36  )-----------( 332      ( 22 Dec 2019 06:52 )             37.3     12-22    139  |  103  |  <4<L>  ----------------------------<  131<H>  4.2   |  26  |  1.15    Ca    9.0      22 Dec 2019 06:52  Phos  3.1     12-22  Mg     2.2     12-22          Magnesium, Serum: 2.2 mg/dL (12-22 @ 06:52)  Phosphorus Level, Serum: 3.1 mg/dL (12-22 @ 06:52)

## 2019-12-22 NOTE — DISCHARGE NOTE PROVIDER - NSDCMRMEDTOKEN_GEN_ALL_CORE_FT
aspirin 325 mg oral tablet: 1 tab(s) orally once a day  folic acid: 1 milligram(s) orally once a day  losartan 25 mg oral tablet: 1 tab(s) orally once a day  simvastatin 20 mg oral tablet: 1 tab(s) orally once a day (at bedtime)

## 2019-12-22 NOTE — PROGRESS NOTE ADULT - SUBJECTIVE AND OBJECTIVE BOX
Surgery Progress Note  Patient is a 74y old  Female who presents with a chief complaint of Abdominal pain (22 Dec 2019 10:13)      SUBJECTIVE: Patient seen and examined at bedside with surgical team, patient without complaints.   Advanced to CLD yesterday. Denies nausea, vomiting, and abdominal pain    Vital Signs Last 24 Hrs  T(C): 36.4 (22 Dec 2019 05:18), Max: 36.9 (21 Dec 2019 21:36)  T(F): 97.5 (22 Dec 2019 05:18), Max: 98.5 (21 Dec 2019 21:36)  HR: 64 (22 Dec 2019 05:18) (64 - 80)  BP: 128/78 (22 Dec 2019 05:18) (127/67 - 162/91)  BP(mean): --  RR: 18 (22 Dec 2019 05:18) (18 - 18)  SpO2: 95% (22 Dec 2019 05:18) (95% - 97%)    Physical Exam  Constitutional: NAD  Respiratory: breathing comfortably on RA  Abd: soft, NT, ND   Ext: moving all 4 ext spontaneously     I&O's Detail    21 Dec 2019 07:01  -  22 Dec 2019 07:00  --------------------------------------------------------  IN:    dextrose 5% + sodium chloride 0.45% with potassium chloride 20 mEq/L: 1200 mL    Oral Fluid: 720 mL    Solution: 50 mL  Total IN: 1970 mL    OUT:    Voided: 350 mL  Total OUT: 350 mL    Total NET: 1620 mL      22 Dec 2019 07:01  -  22 Dec 2019 10:38  --------------------------------------------------------  IN:    dextrose 5% + sodium chloride 0.45% with potassium chloride 20 mEq/L: 300 mL    Oral Fluid: 400 mL  Total IN: 700 mL    OUT:    Voided: 400 mL  Total OUT: 400 mL    Total NET: 300 mL      MEDICATIONS  (STANDING):  dextrose 5% + sodium chloride 0.45% with potassium chloride 20 mEq/L 1000 milliLiter(s) (100 mL/Hr) IV Continuous <Continuous>  enoxaparin Injectable 40 milliGRAM(s) SubCutaneous daily  influenza   Vaccine 0.5 milliLiter(s) IntraMuscular once  losartan 25 milliGRAM(s) Oral daily  piperacillin/tazobactam IVPB.. 3.375 Gram(s) IV Intermittent every 8 hours  simvastatin 20 milliGRAM(s) Oral at bedtime    MEDICATIONS  (PRN):  HYDROmorphone  Injectable 0.5 milliGRAM(s) IV Push every 4 hours PRN Moderate Pain (4 - 6)  HYDROmorphone  Injectable 1 milliGRAM(s) IV Push every 4 hours PRN Severe Pain (7 - 10)      LABS:                        11.6   6.36  )-----------( 332      ( 22 Dec 2019 06:52 )             37.3     12-22    139  |  103  |  <4<L>  ----------------------------<  131<H>  4.2   |  26  |  1.15    Ca    9.0      22 Dec 2019 06:52  Phos  3.1     12-22  Mg     2.2     12-22

## 2019-12-22 NOTE — DISCHARGE NOTE PROVIDER - CARE PROVIDER_API CALL
Marshall Dey)  ColonRectal Surgery; Surgery  [address], [address]  [place] [address]  Phone: [phone]  Fax: [phone]  Follow Up Time: 2 weeks

## 2019-12-22 NOTE — DISCHARGE NOTE NURSING/CASE MANAGEMENT/SOCIAL WORK - PATIENT PORTAL LINK FT
You can access the FollowMyHealth Patient Portal offered by [facility] by registering at the following website: [email or web address]. By joining [employer]’s FollowMyHealth portal, you will also be able to view your health information using other applications (apps) compatible with our system.

## 2019-12-22 NOTE — DISCHARGE NOTE PROVIDER - HOSPITAL COURSE
History of Present Illness:     75 yo woman with HTN, HLD, T2DM, prior classical hysterectomy, lap R adrenalectomy presenting with LLQ for the past three days. The pain is associated with some anorexia, and loose bowel movements. She denies nausea, vomiting, fevers or chills. Of note, patient was recently admitted to [facility] surgical service for H[place] 1b diverticulitis (Oct 2019) managed non-operatively. This is her third episode of diverticulitis in recent years, her first episode was in 2017. Her last colonoscopy was in January 2019, notable for diverticulosis and a single polypectomy. She has never seen a surgeon in the outpatient setting.         CT on admission showed: Sigmoid diverticulitis with associated phlegmonous change and a fluid and air containing collection between small bowel and sigmoid colon now measuring up to 3.0 cm.         Patient was admitted with the diagnosis of diverticulitis and placed on bowel rest.  IV antibiotics were started and serial abdominal exams were done.  Once the patient's abdominal pain began to improve, his diet was then restarted and slowly advanced as tolerated.  As of HD 3 , the patient's pain had resolved and she was tolerating a regular diet without nausea, vomiting, or abdominal pain.  He was voiding well and ambulating without difficulty at the time of discharge.         The patient was instructed to follow up with Dr. Dey within 2 weeks after discharge from the hospital. The patient felt comfortable with discharge. The patient was discharged to home. The patient had no other issues.

## 2019-12-30 ENCOUNTER — APPOINTMENT (OUTPATIENT)
Dept: COLORECTAL SURGERY | Facility: CLINIC | Age: 74
End: 2019-12-30
Payer: MEDICARE

## 2019-12-30 VITALS
OXYGEN SATURATION: 96 % | DIASTOLIC BLOOD PRESSURE: 90 MMHG | SYSTOLIC BLOOD PRESSURE: 146 MMHG | BODY MASS INDEX: 35.99 KG/M2 | HEIGHT: 65 IN | WEIGHT: 216 LBS | HEART RATE: 78 BPM | TEMPERATURE: 98.3 F | RESPIRATION RATE: 14 BRPM

## 2019-12-30 PROCEDURE — 99215 OFFICE O/P EST HI 40 MIN: CPT

## 2019-12-30 RX ORDER — METRONIDAZOLE 500 MG/1
500 TABLET ORAL 3 TIMES DAILY
Qty: 3 | Refills: 1 | Status: ACTIVE | COMMUNITY
Start: 2019-12-30 | End: 1900-01-01

## 2019-12-30 RX ORDER — NEOMYCIN SULFATE 500 MG/1
500 TABLET ORAL
Qty: 6 | Refills: 0 | Status: ACTIVE | COMMUNITY
Start: 2019-12-30 | End: 1900-01-01

## 2019-12-30 RX ORDER — FOLIC ACID 20 MG
CAPSULE ORAL
Refills: 0 | Status: ACTIVE | COMMUNITY

## 2019-12-30 RX ORDER — LOSARTAN POTASSIUM 25 MG/1
25 TABLET, FILM COATED ORAL
Refills: 0 | Status: ACTIVE | COMMUNITY

## 2019-12-30 RX ORDER — SIMVASTATIN 20 MG/1
20 TABLET, FILM COATED ORAL
Refills: 0 | Status: ACTIVE | COMMUNITY

## 2019-12-30 NOTE — PHYSICAL EXAM
[Abdomen Masses] : No abdominal masses [Tender] : nontender [Exam Deferred] : exam was deferred [Normal Breath Sounds] : Normal breath sounds [Wheezing] : no wheezing was heard [Normal Heart Sounds] : normal heart sounds [Normal Rate and Rhythm] : normal rate and rhythm [No Rash or Lesion] : No rash or lesion [Purpura] : no purpura  [Petechiae] : no petechiae [Skin Ulcer] : no ulcer [Alert] : alert [Skin Induration] : no induration [Oriented to Person] : oriented to person [Oriented to Time] : oriented to time [Oriented to Place] : oriented to place [Calm] : calm [de-identified] : Benign abdomen [de-identified] : No apparent distress [de-identified] : Pupils equal round and reactive to light and accommodation [de-identified] : Full range of motion

## 2019-12-30 NOTE — ASSESSMENT
[FreeTextEntry1] : 74-year-old female with multiple episodes of diverticulitis recently discharged after her episode of complicated diverticulitis. An extensive discussion with the patient and her  about the need for colon resection. Her last colonoscopy was in January of this year therefore we did not need to re re scope her.\par \par I had an extensive discussion with the patient regarding the risks and benefits of an anterior resection/left colectomy.\par The risks discussed include but are not limited to, bleeding, ureteral injury, injury to other organs or blood vessels in the abdomen, delayed perforation secondary to a thermal injury.  thromboembolic events, wound infection, and anastomotic leak which may require emergent surgery and creation of a colostomy, as well as sexual and urinary complications from nerve injury.  Furthermore, the post-operative requirements for discharge were discussed which are passing flatus (bowel movement is not necessary), tolerating solid food, and adequate pain control without intravenous medication.\par

## 2019-12-30 NOTE — HISTORY OF PRESENT ILLNESS
[FreeTextEntry1] : This is a very pleasant 74-year-old Orthodox who was recently discharged from the hospital after her third episode of diverticulitis. She was treated with antibiotics and symptomatically improved and was subsequently discharged. The CAT scan showed phlegmonous change with air outside of the bowel consistent with complicated diverticulitis. Currently she is finishing her antibiotic regimen and feels much better. She denies fever. His having normal bowel movements

## 2020-01-02 RX ORDER — METRONIDAZOLE 500 MG/1
500 TABLET ORAL
Qty: 3 | Refills: 0 | Status: ACTIVE | COMMUNITY
Start: 2020-01-02 | End: 1900-01-01

## 2020-01-02 RX ORDER — NEOMYCIN SULFATE 500 MG/1
500 TABLET ORAL
Qty: 6 | Refills: 0 | Status: ACTIVE | COMMUNITY
Start: 2020-01-02 | End: 1900-01-01

## 2020-01-07 ENCOUNTER — RX RENEWAL (OUTPATIENT)
Age: 75
End: 2020-01-07

## 2020-01-07 RX ORDER — AMOXICILLIN AND CLAVULANATE POTASSIUM 875; 125 MG/1; MG/1
875-125 TABLET, COATED ORAL
Qty: 20 | Refills: 0 | Status: ACTIVE | COMMUNITY
Start: 2020-01-07 | End: 1900-01-01

## 2020-01-23 RX ORDER — AMOXICILLIN AND CLAVULANATE POTASSIUM 875; 125 MG/1; MG/1
875-125 TABLET, COATED ORAL TWICE DAILY
Qty: 42 | Refills: 0 | Status: ACTIVE | COMMUNITY
Start: 2020-01-23 | End: 1900-01-01

## 2020-02-13 ENCOUNTER — OUTPATIENT (OUTPATIENT)
Dept: OUTPATIENT SERVICES | Facility: HOSPITAL | Age: 75
LOS: 1 days | End: 2020-02-13
Payer: MEDICARE

## 2020-02-13 VITALS
HEART RATE: 88 BPM | SYSTOLIC BLOOD PRESSURE: 162 MMHG | TEMPERATURE: 98 F | HEIGHT: 64 IN | WEIGHT: 222.01 LBS | DIASTOLIC BLOOD PRESSURE: 98 MMHG | OXYGEN SATURATION: 96 % | RESPIRATION RATE: 18 BRPM

## 2020-02-13 DIAGNOSIS — Z01.818 ENCOUNTER FOR OTHER PREPROCEDURAL EXAMINATION: ICD-10-CM

## 2020-02-13 DIAGNOSIS — I63.9 CEREBRAL INFARCTION, UNSPECIFIED: ICD-10-CM

## 2020-02-13 DIAGNOSIS — Z90.710 ACQUIRED ABSENCE OF BOTH CERVIX AND UTERUS: Chronic | ICD-10-CM

## 2020-02-13 DIAGNOSIS — Z90.89 ACQUIRED ABSENCE OF OTHER ORGANS: Chronic | ICD-10-CM

## 2020-02-13 DIAGNOSIS — K57.92 DIVERTICULITIS OF INTESTINE, PART UNSPECIFIED, WITHOUT PERFORATION OR ABSCESS WITHOUT BLEEDING: ICD-10-CM

## 2020-02-13 DIAGNOSIS — E89.6 POSTPROCEDURAL ADRENOCORTICAL (-MEDULLARY) HYPOFUNCTION: Chronic | ICD-10-CM

## 2020-02-13 LAB
BLD GP AB SCN SERPL QL: NEGATIVE — SIGNIFICANT CHANGE UP
HBA1C BLD-MCNC: 6.7 % — HIGH (ref 4–5.6)
RH IG SCN BLD-IMP: POSITIVE — SIGNIFICANT CHANGE UP

## 2020-02-13 PROCEDURE — 83036 HEMOGLOBIN GLYCOSYLATED A1C: CPT

## 2020-02-13 PROCEDURE — 86850 RBC ANTIBODY SCREEN: CPT

## 2020-02-13 PROCEDURE — 87086 URINE CULTURE/COLONY COUNT: CPT

## 2020-02-13 PROCEDURE — 86900 BLOOD TYPING SEROLOGIC ABO: CPT

## 2020-02-13 PROCEDURE — G0463: CPT

## 2020-02-13 PROCEDURE — 86901 BLOOD TYPING SEROLOGIC RH(D): CPT

## 2020-02-13 RX ORDER — CEFOTETAN DISODIUM 1 G
2 VIAL (EA) INJECTION ONCE
Refills: 0 | Status: COMPLETED | OUTPATIENT
Start: 2020-02-20 | End: 2020-02-20

## 2020-02-13 NOTE — H&P PST ADULT - NSICDXPROBLEM_GEN_ALL_CORE_FT
PROBLEM DIAGNOSES  Problem: Diverticulitis  Assessment and Plan:  Laparoscopic anterior resection and catheter insertion.  Dr. Tellez aware that patient is Jehovah witness    Problem: Cerebrovascular accident (CVA)  Assessment and Plan: Instructed to continue aspirin PROBLEM DIAGNOSES  Problem: Diverticulitis  Assessment and Plan:  Laparoscopic anterior resection and catheter insertion.  Dr. Tellez aware that patient is Jehovah witness    Problem: Cerebrovascular accident (CVA)  Assessment and Plan: As per Gabrielle Weinstein will hold aspirin 7 days prior to surgery

## 2020-02-13 NOTE — H&P PST ADULT - NSANTHOSAYNRD_GEN_A_CORE
No. JEAN CARLOS screening performed.  STOP BANG Legend: 0-2 = LOW Risk; 3-4 = INTERMEDIATE Risk; 5-8 = HIGH Risk

## 2020-02-13 NOTE — H&P PST ADULT - HISTORY OF PRESENT ILLNESS
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  Presents today for laparoscopic anterior resection, catheter insertion.

## 2020-02-14 LAB
CULTURE RESULTS: NO GROWTH — SIGNIFICANT CHANGE UP
SPECIMEN SOURCE: SIGNIFICANT CHANGE UP

## 2020-02-19 ENCOUNTER — TRANSCRIPTION ENCOUNTER (OUTPATIENT)
Age: 75
End: 2020-02-19

## 2020-02-20 ENCOUNTER — RESULT REVIEW (OUTPATIENT)
Age: 75
End: 2020-02-20

## 2020-02-20 ENCOUNTER — INPATIENT (INPATIENT)
Facility: HOSPITAL | Age: 75
LOS: 4 days | Discharge: HOME | DRG: 330 | End: 2020-02-25
Attending: SURGERY | Admitting: SURGERY
Payer: MEDICARE

## 2020-02-20 ENCOUNTER — APPOINTMENT (OUTPATIENT)
Dept: COLORECTAL SURGERY | Facility: HOSPITAL | Age: 75
End: 2020-02-20
Payer: COMMERCIAL

## 2020-02-20 VITALS
DIASTOLIC BLOOD PRESSURE: 96 MMHG | WEIGHT: 222.01 LBS | TEMPERATURE: 98 F | OXYGEN SATURATION: 96 % | HEART RATE: 118 BPM | SYSTOLIC BLOOD PRESSURE: 160 MMHG | HEIGHT: 64 IN | RESPIRATION RATE: 18 BRPM

## 2020-02-20 DIAGNOSIS — E89.6 POSTPROCEDURAL ADRENOCORTICAL (-MEDULLARY) HYPOFUNCTION: Chronic | ICD-10-CM

## 2020-02-20 DIAGNOSIS — K57.92 DIVERTICULITIS OF INTESTINE, PART UNSPECIFIED, WITHOUT PERFORATION OR ABSCESS WITHOUT BLEEDING: ICD-10-CM

## 2020-02-20 DIAGNOSIS — Z90.710 ACQUIRED ABSENCE OF BOTH CERVIX AND UTERUS: Chronic | ICD-10-CM

## 2020-02-20 DIAGNOSIS — Z90.89 ACQUIRED ABSENCE OF OTHER ORGANS: Chronic | ICD-10-CM

## 2020-02-20 LAB — GLUCOSE BLDC GLUCOMTR-MCNC: 157 MG/DL — HIGH (ref 70–99)

## 2020-02-20 PROCEDURE — 50715 RELEASE OF URETER: CPT | Mod: 59

## 2020-02-20 PROCEDURE — 97605 NEG PRS WND THER DME<=50SQCM: CPT

## 2020-02-20 PROCEDURE — 44660 REPAIR BOWEL-BLADDER FISTULA: CPT

## 2020-02-20 PROCEDURE — 44145 PARTIAL REMOVAL OF COLON: CPT | Mod: 59

## 2020-02-20 PROCEDURE — 45300 PROCTOSIGMOIDOSCOPY DX: CPT | Mod: 59

## 2020-02-20 PROCEDURE — 44120 REMOVAL OF SMALL INTESTINE: CPT | Mod: 59

## 2020-02-20 PROCEDURE — 88307 TISSUE EXAM BY PATHOLOGIST: CPT | Mod: 26

## 2020-02-20 RX ORDER — ACETAMINOPHEN 500 MG
1000 TABLET ORAL EVERY 6 HOURS
Refills: 0 | Status: COMPLETED | OUTPATIENT
Start: 2020-02-20 | End: 2020-02-21

## 2020-02-20 RX ORDER — OXYCODONE HYDROCHLORIDE 5 MG/1
10 TABLET ORAL EVERY 4 HOURS
Refills: 0 | Status: DISCONTINUED | OUTPATIENT
Start: 2020-02-20 | End: 2020-02-25

## 2020-02-20 RX ORDER — CHLORHEXIDINE GLUCONATE 213 G/1000ML
1 SOLUTION TOPICAL ONCE
Refills: 0 | Status: DISCONTINUED | OUTPATIENT
Start: 2020-02-20 | End: 2020-02-20

## 2020-02-20 RX ORDER — FOLIC ACID 0.8 MG
1 TABLET ORAL
Qty: 0 | Refills: 0 | DISCHARGE

## 2020-02-20 RX ORDER — APREPITANT 80 MG/1
40 CAPSULE ORAL ONCE
Refills: 0 | Status: COMPLETED | OUTPATIENT
Start: 2020-02-20 | End: 2020-02-20

## 2020-02-20 RX ORDER — LOSARTAN POTASSIUM 100 MG/1
1 TABLET, FILM COATED ORAL
Qty: 0 | Refills: 0 | DISCHARGE

## 2020-02-20 RX ORDER — HEPARIN SODIUM 5000 [USP'U]/ML
5000 INJECTION INTRAVENOUS; SUBCUTANEOUS EVERY 8 HOURS
Refills: 0 | Status: DISCONTINUED | OUTPATIENT
Start: 2020-02-21 | End: 2020-02-25

## 2020-02-20 RX ORDER — SODIUM CHLORIDE 9 MG/ML
1000 INJECTION, SOLUTION INTRAVENOUS
Refills: 0 | Status: DISCONTINUED | OUTPATIENT
Start: 2020-02-20 | End: 2020-02-21

## 2020-02-20 RX ORDER — SODIUM CHLORIDE 9 MG/ML
3 INJECTION INTRAMUSCULAR; INTRAVENOUS; SUBCUTANEOUS EVERY 8 HOURS
Refills: 0 | Status: DISCONTINUED | OUTPATIENT
Start: 2020-02-20 | End: 2020-02-20

## 2020-02-20 RX ORDER — GABAPENTIN 400 MG/1
600 CAPSULE ORAL ONCE
Refills: 0 | Status: COMPLETED | OUTPATIENT
Start: 2020-02-20 | End: 2020-02-20

## 2020-02-20 RX ORDER — SIMVASTATIN 20 MG/1
1 TABLET, FILM COATED ORAL
Qty: 0 | Refills: 0 | DISCHARGE

## 2020-02-20 RX ORDER — CELECOXIB 200 MG/1
400 CAPSULE ORAL ONCE
Refills: 0 | Status: COMPLETED | OUTPATIENT
Start: 2020-02-20 | End: 2020-02-20

## 2020-02-20 RX ORDER — KETOROLAC TROMETHAMINE 30 MG/ML
15 SYRINGE (ML) INJECTION EVERY 6 HOURS
Refills: 0 | Status: DISCONTINUED | OUTPATIENT
Start: 2020-02-20 | End: 2020-02-21

## 2020-02-20 RX ORDER — LIDOCAINE HCL 20 MG/ML
0.2 VIAL (ML) INJECTION ONCE
Refills: 0 | Status: DISCONTINUED | OUTPATIENT
Start: 2020-02-20 | End: 2020-02-20

## 2020-02-20 RX ORDER — OXYCODONE HYDROCHLORIDE 5 MG/1
5 TABLET ORAL EVERY 4 HOURS
Refills: 0 | Status: DISCONTINUED | OUTPATIENT
Start: 2020-02-20 | End: 2020-02-25

## 2020-02-20 RX ADMIN — APREPITANT 40 MILLIGRAM(S): 80 CAPSULE ORAL at 07:10

## 2020-02-20 RX ADMIN — SODIUM CHLORIDE 40 MILLILITER(S): 9 INJECTION, SOLUTION INTRAVENOUS at 14:14

## 2020-02-20 RX ADMIN — GABAPENTIN 600 MILLIGRAM(S): 400 CAPSULE ORAL at 05:38

## 2020-02-20 RX ADMIN — Medication 1000 MILLIGRAM(S): at 20:10

## 2020-02-20 RX ADMIN — Medication 400 MILLIGRAM(S): at 19:40

## 2020-02-20 NOTE — BRIEF OPERATIVE NOTE - NSICDXBRIEFPROCEDURE_GEN_ALL_CORE_FT
PROCEDURES:  Small bowel resection with anastomosis 20-Feb-2020 13:27:20  Lj Veras  Left ureterolysis 20-Feb-2020 13:27:13  Lj Veras  Anterior resection of colon 20-Feb-2020 13:26:49  Lj Veras

## 2020-02-20 NOTE — BRIEF OPERATIVE NOTE - TYPE OF ANESTHESIA
LM labs are Ok except low vit D. Recommend supplementing it. Consider adding effexor for the anxiety as it is more activating.     General

## 2020-02-20 NOTE — CHART NOTE - NSCHARTNOTEFT_GEN_A_CORE
Surgery Post-Operative Note    Pre-op Dx: Diverticulosis  Surgeon: Dr. Dey  Procedure: LAR, SBR w/ anastomosis    SUBJECTIVE:  - Patient seen and examined at bedside   - Patient states feeling well, no complaints  - Denies abdominal pain, N/V, chest pain, SOB  --------------------------------------------------------------------------------------------------  OBJECTIVE:   Physical Exam:  General: AAOx3, NAD, lying comfortably in bed  HEENT: NC/AT  Respiratory: nonlabored breathing  Cardiovascular: RRR, normal S1 and S2, no murmurs or gallops  Abdomen: obese, distended, soft, non-tender, wound VAC holding suction  Extremities: WWP, no edema  Drain: Wound VAC in place, holding suction  --------------------------------------------------------------------------------------------------  V/S:  Vital Signs Last 24 Hrs  T(C): 36.8 (20 Feb 2020 19:01), Max: 36.8 (20 Feb 2020 05:52)  T(F): 98.2 (20 Feb 2020 19:01), Max: 98.2 (20 Feb 2020 05:52)  HR: 81 (20 Feb 2020 19:01) (77 - 118)  BP: 133/84 (20 Feb 2020 19:01) (102/59 - 160/96)  BP(mean): 98 (20 Feb 2020 15:30) (77 - 98)  RR: 18 (20 Feb 2020 19:01) (14 - 18)  SpO2: 98% (20 Feb 2020 19:01) (94% - 100%)    --------------------------------------------------------------------------------------------------  I/Os:    20 Feb 2020 07:01  -  20 Feb 2020 19:09  --------------------------------------------------------  IN:    lactated ringers.: 160 mL    Oral Fluid: 120 mL  Total IN: 280 mL    OUT:    Indwelling Catheter - Urethral: 140 mL  Total OUT: 140 mL    Total NET: 140 mL        --------------------------------------------------------------------------------------------------  LABS:            CAPILLARY BLOOD GLUCOSE      POCT Blood Glucose.: 157 mg/dL (20 Feb 2020 05:43)              --------------------------------------------------------------------------------------------------  MEDICATIONS  (STANDING):  acetaminophen  IVPB .. 1000 milliGRAM(s) IV Intermittent every 6 hours  ketorolac   Injectable 15 milliGRAM(s) IV Push every 6 hours  lactated ringers. 1000 milliLiter(s) (40 mL/Hr) IV Continuous <Continuous>    MEDICATIONS  (PRN):  oxyCODONE    IR 5 milliGRAM(s) Oral every 4 hours PRN Moderate Pain (4 - 6)  oxyCODONE    IR 10 milliGRAM(s) Oral every 4 hours PRN Severe Pain (7 - 10)    --------------------------------------------------------------------------------------------------  ASSESSMENT:  75 y/o F Jehovah Witness [facility] CVA 2007 with no residual neurological deficits, diverticulitis, had a recent exacerbation 11/2019. Patient now s/p laparoscopic anterior resection, SBR w/ anastomosis.    Plan:  ERAS protocol   - Pain control: Acetaminophen 1000mg q 8hr x 24hrs, toradol 15mg q 8hrs x 24hrs, Oxycodon 5mg IR moderate pain and 10mg severe pain, 0.2mg IV dilaudid for BT pain only  - MagOxide 1gm BID (POD#1)  - OOB/ambi/IS  - Fluid management: LR 40, change to (30cc/kg/24hr); avoid fluid boluses, d/c after 600cc PO or tolerating solids  - Diet: low residue diet as tolerated  - ondansetron 8mg RTC x24hrs, reglan PRN   - VTE PPx: SCDs, OOB, AYALA  - d/c figueroa POD#1, TOV  - D/C home when flatus/BM and tolerating LRD    Red Surgery  [id number]

## 2020-02-20 NOTE — BRIEF OPERATIVE NOTE - OPERATION/FINDINGS
Laparoscopic converted to open anterior resection with primary anastomosis. Pre-operative ureteral stents were placed, but epidural was unsuccessful. After achieving pneumoperitoneum, a dense ball of sigmoid colon/diverticulosis and small bowel were adherent to the posterior abdominal wall in the left lower quadrant which was unable to be dissected laparoscopically and we converted to open. The colon was mobilized from the RP and near the rectum but could not be freed from the small bowel so we took the small bowel en block with the colonic resection after stapling across the small bowel and ligating the mesentery. The small bowel was re-anastomosed prior to closing. Extensive adhesiolysis including take down of colo-vesicular fistula and left ureterolysis was performed to safely separate the colon from the surrounding structures. It was then excised with the portion of small bowel and a recto-colonic anastomosis was made. The anastomosis was check by rectal insufflation. Hemostasis was achieved and the abdomen was closed with a wound vac placed on the top.

## 2020-02-20 NOTE — BRIEF OPERATIVE NOTE - NSICDXBRIEFPREOP_GEN_ALL_CORE_FT
PRE-OP DIAGNOSIS:  Diverticulosis 20-Feb-2020 13:28:41  Lj Veras  Diverticulosis 20-Feb-2020 13:27:48  Lj Veras

## 2020-02-21 ENCOUNTER — TRANSCRIPTION ENCOUNTER (OUTPATIENT)
Age: 75
End: 2020-02-21

## 2020-02-21 DIAGNOSIS — I63.9 CEREBRAL INFARCTION, UNSPECIFIED: ICD-10-CM

## 2020-02-21 LAB
ANION GAP SERPL CALC-SCNC: 11 MMOL/L — SIGNIFICANT CHANGE UP (ref 5–17)
BUN SERPL-MCNC: 21 MG/DL — SIGNIFICANT CHANGE UP (ref 7–23)
CALCIUM SERPL-MCNC: 9.1 MG/DL — SIGNIFICANT CHANGE UP (ref 8.4–10.5)
CHLORIDE SERPL-SCNC: 99 MMOL/L — SIGNIFICANT CHANGE UP (ref 96–108)
CO2 SERPL-SCNC: 24 MMOL/L — SIGNIFICANT CHANGE UP (ref 22–31)
CREAT SERPL-MCNC: 1.46 MG/DL — HIGH (ref 0.5–1.3)
GLUCOSE SERPL-MCNC: 186 MG/DL — HIGH (ref 70–99)
HCT VFR BLD CALC: 38.5 % — SIGNIFICANT CHANGE UP (ref 34.5–45)
HGB BLD-MCNC: 11.8 G/DL — SIGNIFICANT CHANGE UP (ref 11.5–15.5)
MAGNESIUM SERPL-MCNC: 1.9 MG/DL — SIGNIFICANT CHANGE UP (ref 1.6–2.6)
MCHC RBC-ENTMCNC: 26.3 PG — LOW (ref 27–34)
MCHC RBC-ENTMCNC: 30.6 GM/DL — LOW (ref 32–36)
MCV RBC AUTO: 85.7 FL — SIGNIFICANT CHANGE UP (ref 80–100)
NRBC # BLD: 0 /100 WBCS — SIGNIFICANT CHANGE UP (ref 0–0)
PHOSPHATE SERPL-MCNC: 4.2 MG/DL — SIGNIFICANT CHANGE UP (ref 2.5–4.5)
PLATELET # BLD AUTO: 303 K/UL — SIGNIFICANT CHANGE UP (ref 150–400)
POTASSIUM SERPL-MCNC: 5.1 MMOL/L — SIGNIFICANT CHANGE UP (ref 3.5–5.3)
POTASSIUM SERPL-SCNC: 5.1 MMOL/L — SIGNIFICANT CHANGE UP (ref 3.5–5.3)
RBC # BLD: 4.49 M/UL — SIGNIFICANT CHANGE UP (ref 3.8–5.2)
RBC # FLD: 15.6 % — HIGH (ref 10.3–14.5)
SODIUM SERPL-SCNC: 134 MMOL/L — LOW (ref 135–145)
WBC # BLD: 20.56 K/UL — HIGH (ref 3.8–10.5)
WBC # FLD AUTO: 20.56 K/UL — HIGH (ref 3.8–10.5)

## 2020-02-21 RX ORDER — SIMVASTATIN 20 MG/1
20 TABLET, FILM COATED ORAL AT BEDTIME
Refills: 0 | Status: DISCONTINUED | OUTPATIENT
Start: 2020-02-21 | End: 2020-02-25

## 2020-02-21 RX ORDER — SODIUM CHLORIDE 9 MG/ML
3 INJECTION INTRAMUSCULAR; INTRAVENOUS; SUBCUTANEOUS EVERY 8 HOURS
Refills: 0 | Status: DISCONTINUED | OUTPATIENT
Start: 2020-02-21 | End: 2020-02-25

## 2020-02-21 RX ORDER — LOSARTAN POTASSIUM 100 MG/1
25 TABLET, FILM COATED ORAL DAILY
Refills: 0 | Status: DISCONTINUED | OUTPATIENT
Start: 2020-02-21 | End: 2020-02-22

## 2020-02-21 RX ORDER — MAGNESIUM SULFATE 500 MG/ML
1 VIAL (ML) INJECTION ONCE
Refills: 0 | Status: COMPLETED | OUTPATIENT
Start: 2020-02-21 | End: 2020-02-21

## 2020-02-21 RX ORDER — ASPIRIN/CALCIUM CARB/MAGNESIUM 324 MG
1 TABLET ORAL
Qty: 0 | Refills: 0 | DISCHARGE

## 2020-02-21 RX ORDER — OXYCODONE HYDROCHLORIDE 5 MG/1
1 TABLET ORAL
Qty: 14 | Refills: 0
Start: 2020-02-21

## 2020-02-21 RX ADMIN — Medication 15 MILLIGRAM(S): at 05:40

## 2020-02-21 RX ADMIN — Medication 400 MILLIGRAM(S): at 03:17

## 2020-02-21 RX ADMIN — Medication 15 MILLIGRAM(S): at 05:07

## 2020-02-21 RX ADMIN — SODIUM CHLORIDE 3 MILLILITER(S): 9 INJECTION INTRAMUSCULAR; INTRAVENOUS; SUBCUTANEOUS at 17:18

## 2020-02-21 RX ADMIN — Medication 1000 MILLIGRAM(S): at 13:54

## 2020-02-21 RX ADMIN — Medication 1000 MILLIGRAM(S): at 03:50

## 2020-02-21 RX ADMIN — Medication 15 MILLIGRAM(S): at 17:20

## 2020-02-21 RX ADMIN — LOSARTAN POTASSIUM 25 MILLIGRAM(S): 100 TABLET, FILM COATED ORAL at 17:20

## 2020-02-21 RX ADMIN — HEPARIN SODIUM 5000 UNIT(S): 5000 INJECTION INTRAVENOUS; SUBCUTANEOUS at 13:34

## 2020-02-21 RX ADMIN — HEPARIN SODIUM 5000 UNIT(S): 5000 INJECTION INTRAVENOUS; SUBCUTANEOUS at 21:24

## 2020-02-21 RX ADMIN — SODIUM CHLORIDE 3 MILLILITER(S): 9 INJECTION INTRAMUSCULAR; INTRAVENOUS; SUBCUTANEOUS at 21:22

## 2020-02-21 RX ADMIN — SIMVASTATIN 20 MILLIGRAM(S): 20 TABLET, FILM COATED ORAL at 21:24

## 2020-02-21 RX ADMIN — Medication 400 MILLIGRAM(S): at 13:31

## 2020-02-21 RX ADMIN — Medication 400 MILLIGRAM(S): at 07:39

## 2020-02-21 RX ADMIN — HEPARIN SODIUM 5000 UNIT(S): 5000 INJECTION INTRAVENOUS; SUBCUTANEOUS at 05:07

## 2020-02-21 RX ADMIN — Medication 15 MILLIGRAM(S): at 10:52

## 2020-02-21 RX ADMIN — Medication 1000 MILLIGRAM(S): at 08:10

## 2020-02-21 RX ADMIN — Medication 100 GRAM(S): at 13:36

## 2020-02-21 RX ADMIN — Medication 15 MILLIGRAM(S): at 11:20

## 2020-02-21 NOTE — DISCHARGE NOTE PROVIDER - NSDCCPTREATMENT_GEN_ALL_CORE_FT
PRINCIPAL PROCEDURE  Procedure: Anterior resection of colon  Findings and Treatment:       SECONDARY PROCEDURE  Procedure: Left ureterolysis  Findings and Treatment:     Procedure: Small bowel resection with anastomosis  Findings and Treatment: PRINCIPAL PROCEDURE  Procedure: Anterior resection of colon  Findings and Treatment: Laparoscopic converted to open      SECONDARY PROCEDURE  Procedure: Left ureterolysis  Findings and Treatment:     Procedure: Small bowel resection with anastomosis  Findings and Treatment:

## 2020-02-21 NOTE — DISCHARGE NOTE PROVIDER - NSDCFUADDINST_GEN_ALL_CORE_FT
call md for fever more than 101F worsening NV or worsening pain NOTIFY YOUR SURGEON IF: You have any bleeding that does not stop, any pus draining from your wound, any fever (over 100.4 F) or chills, persistent nausea/vomiting, persistent diarrhea, or if your pain is not controlled on your discharge pain medications.   Activity- No heavy lifting or straining over 15 lbs for the next two weeks;  Driving- Please do not drive until your pain is well controlled and you do not need to take pain medications.

## 2020-02-21 NOTE — PROGRESS NOTE ADULT - SUBJECTIVE AND OBJECTIVE BOX
Surgery Progress Note    SUBJECTIVE:  - Patient seen and examined at bedside   - Patient states feeling well  - Denies abdominal pain, N/V, chest pain, SOB  --------------------------------------------------------------------------------------------------  OBJECTIVE:   Physical Exam:  General: AAOx3, NAD, lying comfortably in bed  HEENT: NC/AT  Respiratory: nonlabored breathing  Cardiovascular: RRR, normal S1 and S2, no murmurs or gallops  Abdomen: non-distended, soft, non-tender  Extremities: WWP, no edema  Drain: wound VAC holding suction  --------------------------------------------------------------------------------------------------  V/S:  Vital Signs Last 24 Hrs  T(C): 36.5 (21 Feb 2020 09:34), Max: 36.8 (20 Feb 2020 19:01)  T(F): 97.7 (21 Feb 2020 09:34), Max: 98.2 (20 Feb 2020 19:01)  HR: 76 (21 Feb 2020 09:34) (76 - 101)  BP: 127/79 (21 Feb 2020 09:34) (102/59 - 138/83)  BP(mean): 98 (20 Feb 2020 15:30) (77 - 98)  RR: 18 (21 Feb 2020 09:34) (14 - 18)  SpO2: 97% (21 Feb 2020 09:34) (94% - 100%)    --------------------------------------------------------------------------------------------------  I/Os:    20 Feb 2020 07:01  -  21 Feb 2020 07:00  --------------------------------------------------------  IN:    IV PiggyBack: 200 mL    lactated ringers.: 640 mL    Oral Fluid: 340 mL  Total IN: 1180 mL    OUT:    Indwelling Catheter - Urethral: 515 mL  Total OUT: 515 mL    Total NET: 665 mL        --------------------------------------------------------------------------------------------------  LABS:                        11.8   20.56 )-----------( 303      ( 21 Feb 2020 09:27 )             38.5   --------------------------------------------------------------------------------------------------  MEDICATIONS  (STANDING):  acetaminophen  IVPB .. 1000 milliGRAM(s) IV Intermittent every 6 hours  heparin  Injectable 5000 Unit(s) SubCutaneous every 8 hours  ketorolac   Injectable 15 milliGRAM(s) IV Push every 6 hours  lactated ringers. 1000 milliLiter(s) (60 mL/Hr) IV Continuous <Continuous>  simvastatin 20 milliGRAM(s) Oral at bedtime    MEDICATIONS  (PRN):  oxyCODONE    IR 5 milliGRAM(s) Oral every 4 hours PRN Moderate Pain (4 - 6)  oxyCODONE    IR 10 milliGRAM(s) Oral every 4 hours PRN Severe Pain (7 - 10)    --------------------------------------------------------------------------------------------------

## 2020-02-21 NOTE — DISCHARGE NOTE PROVIDER - NSDCCPCAREPLAN_GEN_ALL_CORE_FT
PRINCIPAL DISCHARGE DIAGNOSIS  Diagnosis: Diverticulitis  Assessment and Plan of Treatment:       SECONDARY DISCHARGE DIAGNOSES  Diagnosis: History of cerebrovascular accident (CVA) greater than eight weeks in the past  Assessment and Plan of Treatment: PRINCIPAL DISCHARGE DIAGNOSIS  Diagnosis: Diverticulitis  Assessment and Plan of Treatment: Follow up with Dr. Dey in 10 days. Please call to schedule an appointment.   NOTIFY YOUR SURGEON IF: You have any bleeding that does not stop, any pus draining from your wound, any fever (over 100.4 F) or chills, persistent nausea/vomiting, persistent diarrhea, or if your pain is not controlled on your discharge pain medications.   Wound:   You have a wound vac in place. A visiting nurse will come to your house to change it  M/W/F.      SECONDARY DISCHARGE DIAGNOSES  Diagnosis: Hypophosphatemia  Assessment and Plan of Treatment: Pt treated with repletions    Diagnosis: History of cerebrovascular accident (CVA) greater than eight weeks in the past  Assessment and Plan of Treatment:

## 2020-02-21 NOTE — DISCHARGE NOTE PROVIDER - NSDCMRMEDTOKEN_GEN_ALL_CORE_FT
aspirin 325 mg oral tablet: 1 tab(s) orally once a day MAY RESUME MONDAY 2/24/20  folic acid: 1 milligram(s) orally once a day  losartan 25 mg oral tablet: 1 tab(s) orally once a day  oxyCODONE 5 mg oral tablet: 1 tab(s) orally every 4 hours, As needed, Moderate Pain (4 - 6) MDD:6  simvastatin 20 mg oral tablet: 1 tab(s) orally once a day (at bedtime)  Tylenol 500 mg oral tablet: 2 tab(s) orally every 6 hours for 48 hours than take AS NEEDED for pain aspirin 325 mg oral tablet: 1 tab(s) orally once a day MAY RESUME MONDAY 2/24/20  folic acid: 1 milligram(s) orally once a day  losartan 25 mg oral tablet: 1 tab(s) orally once a day  oxyCODONE 5 mg oral tablet: 1 tab(s) orally every 4 hours, As needed, Moderate Pain (4 - 6) MDD:6  simvastatin 20 mg oral tablet: 1 tab(s) orally once a day (at bedtime)  Tylenol 500 mg oral tablet: 1-2 tab(s) orally every 6 hours as needed for mild pain aspirin 325 mg oral tablet: 1 tab(s) orally once a day MAY RESUME MONDAY 2/24/20  Cipro 500 mg oral tablet: 1 tab(s) orally 2 times a day   folic acid: 1 milligram(s) orally once a day  losartan 25 mg oral tablet: 1 tab(s) orally once a day  oxyCODONE 5 mg oral tablet: 1 tab(s) orally every 4 hours, As needed, Moderate Pain (4 - 6) MDD:6  simvastatin 20 mg oral tablet: 1 tab(s) orally once a day (at bedtime)  Tylenol 500 mg oral tablet: 1-2 tab(s) orally every 6 hours as needed for mild pain

## 2020-02-21 NOTE — DIETITIAN INITIAL EVALUATION ADULT. - OTHER INFO
Pertinent Information: T74 y/o F [employer] Witness PMH CVA 2007 with no residual neurological deficits, diverticulitis, had a recent exacerbation 11/2019. Now s/p laparoscopic anterior resection, SBR w/ anastomosis.    Patient reports good PO intake and appetite PTA, typically consumes 2 larger meals per day (skips breakfast), consumes a variety of foods, denies following any specific dietary recommendations. Pt noted with history of diabetes, diet controlled per patient report. Checks BG daily in the morning with values usually below 100mg/dL. Confirms NKFA, Pt denies chewing/swallowing difficulty, nausea, vomiting, diarrhea, constipation PTA. Was taking folic acid, biotin PTA.     Weights: Pt reports UBW as ~ 219lbs, current dosing weight noted as ~222lbs. Pt reports weight has been relatively stables, was trying to lose weight intentionally.     Pt now s/p  laparoscopic anterior resection, SBR w/ anastomosis. Pt currently tolerating clear liquid diet well, no acute GI distress noted. Has passed gas or had BM yet. Discussed typical diet progression with patient. Pt amendable to diet education.  Provided low-fiber nutrition therapy including importance of avoiding  fiber rich foods, fresh fruits/vegetables, whole grains, and added fiber in processed foods. Discussed chewing foods well and adequate hydration. Pt verbalized understanding and accepted written handout. Patient with no nutrition-related questions at this time. Made aware RD remains available as needed. Pertinent Information: T74 y/o F [employer] Witness PMH CVA 2007 with no residual neurological deficits, diverticulitis, had a recent exacerbation 11/2019. Now s/p laparoscopic anterior resection, SBR w/ anastomosis.    Patient reports good PO intake and appetite PTA, typically consumes 2 larger meals per day (skips breakfast), consumes a variety of foods, denies following any specific dietary recommendations. Pt noted with history of diabetes, diet controlled per patient report. Checks BG daily in the morning with values usually below 100mg/dL. Confirms NKFA, Pt denies chewing/swallowing difficulty, nausea, vomiting, diarrhea, constipation PTA. Was taking folic acid, biotin PTA.     Weights: Pt reports UBW as ~ 219lbs, current dosing weight noted as ~222lbs. Pt reports weight has been relatively stables, was trying to lose weight intentionally.     Pt now s/p  laparoscopic anterior resection, SBR w/ anastomosis. Pt currently tolerating clear liquid diet well, no acute GI distress noted. Has passed gas or had BM yet. Discussed typical diet progression with patient. Pt amendable to diet education.  Provided low-fiber nutrition therapy including importance of avoiding  fiber rich foods, fresh fruits/vegetables, whole grains, and added fiber in processed foods. Discussed chewing foods well and adequate hydration. Pt verbalized understanding and accepted written handout. Also discussed portion sizes, pairing protein with carbohydrates, limiting sugar sweetened beverages in diet and the importance of consistent eating pattern to help optimize glycemic control. Patient with no nutrition-related questions at this time. Made aware RD remains available as needed.

## 2020-02-21 NOTE — DISCHARGE NOTE PROVIDER - CARE PROVIDER_API CALL
Marshall Dey)  ColonRectal Surgery; Surgery  [address], [address]  [place] [address]  Phone: [phone]  Fax: [phone]  Follow Up Time: 1 week

## 2020-02-21 NOTE — DIETITIAN INITIAL EVALUATION ADULT. - PHYSICAL APPEARANCE
other (specify)/well nourished/obese Ht: 64 inches , Wt: 222lbs, BMI: 38.1kg/m2, IBW: 120lbs +/- 10%, %IBW:185 %  Edema: none , Skin: free of pressure injuries per nursing flow sheets, abdominal surgical incision.

## 2020-02-21 NOTE — DISCHARGE NOTE PROVIDER - HOSPITAL COURSE
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     Admitted to [facility] to OR underwent a Small bowel resection with anastomosis, Left ureterolysis, Anterior resection of colon        Post op course followed ERAS protocol     Diet was advanced and pt discharged home once return of bowel function (flatus/ BM)        FOllow up as out pt in 1 week 73 y/o F [employer] Witness PMH CVA 2007 with no residual neurological deficits, diverticulitis, had a recent exacerbation 11/2019.     Pt underwent a scheduled laparoscopic converted to open anterior resection w/ primary anastomosis on 2/20/2020. Pt tolerated the procedure well. Was extubated and transferred to PACU in stable condition. ERAS protocol followed. Pt advanced to clears on POD#0. On POD#2, figueroa removed and diet advanced to LRD. VAC changes every other day. Pt discharged home once GI function resumed.         Pt currently tolerating a low fiber diet, ambulating, voiding and pain controlled. Pt stable for discharge to home with home care for VAC changes M/W/F.     Pt instructed to follow up with Dr. Dey in 10 days.

## 2020-02-21 NOTE — DIETITIAN INITIAL EVALUATION ADULT. - ADD RECOMMEND
1) Medical team to advance diet when medically feasible via tolerated route. Consider advancing to carbohydrate consistent + low fiber diet as tolerated. 2) Diet education provided, reinforce as needed.

## 2020-02-21 NOTE — DISCHARGE NOTE PROVIDER - NSDCACTIVITY_GEN_ALL_CORE
Do not drive or operate machinery/Do not make important decisions/Stairs allowed/Showering allowed/Walking - Outdoors allowed/No heavy lifting/straining/Walking - Indoors allowed

## 2020-02-21 NOTE — PROGRESS NOTE ADULT - ASSESSMENT
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 Patient now s/p laparoscopic anterior resection, SBR w/ anastomosis.    Plan:  ERAS protocol   - CLD for now  - MagOxide 1gm BID (POD#1)  - OOB/ambi/IS  - Diet: low residue diet as tolerated  - VTE PPx: SCDs, OOB, AYALA  - d/c figueroa POD#1, TOV  - D/C home when flatus/BM and tolerating LRD    Red Surgery  [id number].

## 2020-02-22 LAB
ANION GAP SERPL CALC-SCNC: 10 MMOL/L — SIGNIFICANT CHANGE UP (ref 5–17)
ANION GAP SERPL CALC-SCNC: 12 MMOL/L — SIGNIFICANT CHANGE UP (ref 5–17)
ANION GAP SERPL CALC-SCNC: 8 MMOL/L — SIGNIFICANT CHANGE UP (ref 5–17)
BUN SERPL-MCNC: 28 MG/DL — HIGH (ref 7–23)
BUN SERPL-MCNC: 29 MG/DL — HIGH (ref 7–23)
BUN SERPL-MCNC: 31 MG/DL — HIGH (ref 7–23)
CALCIUM SERPL-MCNC: 8.4 MG/DL — SIGNIFICANT CHANGE UP (ref 8.4–10.5)
CALCIUM SERPL-MCNC: 8.5 MG/DL — SIGNIFICANT CHANGE UP (ref 8.4–10.5)
CALCIUM SERPL-MCNC: 8.7 MG/DL — SIGNIFICANT CHANGE UP (ref 8.4–10.5)
CHLORIDE SERPL-SCNC: 100 MMOL/L — SIGNIFICANT CHANGE UP (ref 96–108)
CHLORIDE SERPL-SCNC: 101 MMOL/L — SIGNIFICANT CHANGE UP (ref 96–108)
CHLORIDE SERPL-SCNC: 101 MMOL/L — SIGNIFICANT CHANGE UP (ref 96–108)
CO2 SERPL-SCNC: 24 MMOL/L — SIGNIFICANT CHANGE UP (ref 22–31)
CO2 SERPL-SCNC: 25 MMOL/L — SIGNIFICANT CHANGE UP (ref 22–31)
CO2 SERPL-SCNC: 27 MMOL/L — SIGNIFICANT CHANGE UP (ref 22–31)
CREAT SERPL-MCNC: 1.44 MG/DL — HIGH (ref 0.5–1.3)
CREAT SERPL-MCNC: 1.52 MG/DL — HIGH (ref 0.5–1.3)
CREAT SERPL-MCNC: 1.62 MG/DL — HIGH (ref 0.5–1.3)
GLUCOSE SERPL-MCNC: 118 MG/DL — HIGH (ref 70–99)
GLUCOSE SERPL-MCNC: 145 MG/DL — HIGH (ref 70–99)
GLUCOSE SERPL-MCNC: 159 MG/DL — HIGH (ref 70–99)
HCT VFR BLD CALC: 33.5 % — LOW (ref 34.5–45)
HGB BLD-MCNC: 10.5 G/DL — LOW (ref 11.5–15.5)
MAGNESIUM SERPL-MCNC: 2.3 MG/DL — SIGNIFICANT CHANGE UP (ref 1.6–2.6)
MCHC RBC-ENTMCNC: 26.6 PG — LOW (ref 27–34)
MCHC RBC-ENTMCNC: 31.3 GM/DL — LOW (ref 32–36)
MCV RBC AUTO: 85 FL — SIGNIFICANT CHANGE UP (ref 80–100)
NRBC # BLD: 0 /100 WBCS — SIGNIFICANT CHANGE UP (ref 0–0)
PHOSPHATE SERPL-MCNC: 3.2 MG/DL — SIGNIFICANT CHANGE UP (ref 2.5–4.5)
PLATELET # BLD AUTO: 314 K/UL — SIGNIFICANT CHANGE UP (ref 150–400)
POTASSIUM SERPL-MCNC: 4.6 MMOL/L — SIGNIFICANT CHANGE UP (ref 3.5–5.3)
POTASSIUM SERPL-MCNC: 5 MMOL/L — SIGNIFICANT CHANGE UP (ref 3.5–5.3)
POTASSIUM SERPL-MCNC: 5.4 MMOL/L — HIGH (ref 3.5–5.3)
POTASSIUM SERPL-SCNC: 4.6 MMOL/L — SIGNIFICANT CHANGE UP (ref 3.5–5.3)
POTASSIUM SERPL-SCNC: 5 MMOL/L — SIGNIFICANT CHANGE UP (ref 3.5–5.3)
POTASSIUM SERPL-SCNC: 5.4 MMOL/L — HIGH (ref 3.5–5.3)
RBC # BLD: 3.94 M/UL — SIGNIFICANT CHANGE UP (ref 3.8–5.2)
RBC # FLD: 15.5 % — HIGH (ref 10.3–14.5)
SODIUM SERPL-SCNC: 135 MMOL/L — SIGNIFICANT CHANGE UP (ref 135–145)
SODIUM SERPL-SCNC: 136 MMOL/L — SIGNIFICANT CHANGE UP (ref 135–145)
SODIUM SERPL-SCNC: 137 MMOL/L — SIGNIFICANT CHANGE UP (ref 135–145)
WBC # BLD: 16.56 K/UL — HIGH (ref 3.8–10.5)
WBC # FLD AUTO: 16.56 K/UL — HIGH (ref 3.8–10.5)

## 2020-02-22 PROCEDURE — 93010 ELECTROCARDIOGRAM REPORT: CPT

## 2020-02-22 RX ORDER — SODIUM CHLORIDE 9 MG/ML
1000 INJECTION INTRAMUSCULAR; INTRAVENOUS; SUBCUTANEOUS ONCE
Refills: 0 | Status: COMPLETED | OUTPATIENT
Start: 2020-02-22 | End: 2020-02-22

## 2020-02-22 RX ORDER — DEXTROSE MONOHYDRATE, SODIUM CHLORIDE, AND POTASSIUM CHLORIDE 50; .745; 4.5 G/1000ML; G/1000ML; G/1000ML
1000 INJECTION, SOLUTION INTRAVENOUS
Refills: 0 | Status: DISCONTINUED | OUTPATIENT
Start: 2020-02-22 | End: 2020-02-22

## 2020-02-22 RX ORDER — SODIUM CHLORIDE 9 MG/ML
1000 INJECTION INTRAMUSCULAR; INTRAVENOUS; SUBCUTANEOUS
Refills: 0 | Status: DISCONTINUED | OUTPATIENT
Start: 2020-02-22 | End: 2020-02-24

## 2020-02-22 RX ORDER — ACETAMINOPHEN 500 MG
975 TABLET ORAL EVERY 6 HOURS
Refills: 0 | Status: DISCONTINUED | OUTPATIENT
Start: 2020-02-22 | End: 2020-02-25

## 2020-02-22 RX ADMIN — OXYCODONE HYDROCHLORIDE 10 MILLIGRAM(S): 5 TABLET ORAL at 01:30

## 2020-02-22 RX ADMIN — SODIUM CHLORIDE 3 MILLILITER(S): 9 INJECTION INTRAMUSCULAR; INTRAVENOUS; SUBCUTANEOUS at 13:03

## 2020-02-22 RX ADMIN — OXYCODONE HYDROCHLORIDE 10 MILLIGRAM(S): 5 TABLET ORAL at 21:22

## 2020-02-22 RX ADMIN — SODIUM CHLORIDE 3 MILLILITER(S): 9 INJECTION INTRAMUSCULAR; INTRAVENOUS; SUBCUTANEOUS at 05:21

## 2020-02-22 RX ADMIN — SODIUM CHLORIDE 3 MILLILITER(S): 9 INJECTION INTRAMUSCULAR; INTRAVENOUS; SUBCUTANEOUS at 21:18

## 2020-02-22 RX ADMIN — SODIUM CHLORIDE 1000 MILLILITER(S): 9 INJECTION INTRAMUSCULAR; INTRAVENOUS; SUBCUTANEOUS at 11:42

## 2020-02-22 RX ADMIN — OXYCODONE HYDROCHLORIDE 10 MILLIGRAM(S): 5 TABLET ORAL at 21:52

## 2020-02-22 RX ADMIN — SIMVASTATIN 20 MILLIGRAM(S): 20 TABLET, FILM COATED ORAL at 21:21

## 2020-02-22 RX ADMIN — SODIUM CHLORIDE 60 MILLILITER(S): 9 INJECTION INTRAMUSCULAR; INTRAVENOUS; SUBCUTANEOUS at 16:20

## 2020-02-22 RX ADMIN — OXYCODONE HYDROCHLORIDE 10 MILLIGRAM(S): 5 TABLET ORAL at 09:38

## 2020-02-22 RX ADMIN — OXYCODONE HYDROCHLORIDE 10 MILLIGRAM(S): 5 TABLET ORAL at 05:15

## 2020-02-22 RX ADMIN — OXYCODONE HYDROCHLORIDE 10 MILLIGRAM(S): 5 TABLET ORAL at 10:08

## 2020-02-22 RX ADMIN — DEXTROSE MONOHYDRATE, SODIUM CHLORIDE, AND POTASSIUM CHLORIDE 60 MILLILITER(S): 50; .745; 4.5 INJECTION, SOLUTION INTRAVENOUS at 13:00

## 2020-02-22 RX ADMIN — LOSARTAN POTASSIUM 25 MILLIGRAM(S): 100 TABLET, FILM COATED ORAL at 05:15

## 2020-02-22 RX ADMIN — HEPARIN SODIUM 5000 UNIT(S): 5000 INJECTION INTRAVENOUS; SUBCUTANEOUS at 13:03

## 2020-02-22 RX ADMIN — HEPARIN SODIUM 5000 UNIT(S): 5000 INJECTION INTRAVENOUS; SUBCUTANEOUS at 21:22

## 2020-02-22 RX ADMIN — OXYCODONE HYDROCHLORIDE 10 MILLIGRAM(S): 5 TABLET ORAL at 00:51

## 2020-02-22 RX ADMIN — HEPARIN SODIUM 5000 UNIT(S): 5000 INJECTION INTRAVENOUS; SUBCUTANEOUS at 05:15

## 2020-02-22 NOTE — PROGRESS NOTE ADULT - ASSESSMENT
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 Patient now s/p laparoscopic anterior resection, SBR w/ anastomosis.    Plan:  ERAS protocol   - CLD   - OOB/ambi/IS  - VTE PPx: SCDs, OOB, AYALA  - Monitor electrolytes    Red Surgery  [id number].

## 2020-02-22 NOTE — PROGRESS NOTE ADULT - SUBJECTIVE AND OBJECTIVE BOX
Surgery Progress Note    SUBJECTIVE:  - Patient seen and examined at bedside   - Patient states feeling well  - Denies abdominal pain, N/V, chest pain, SOB  --------------------------------------------------------------------------------------------------  OBJECTIVE:   Physical Exam:  General: AAOx3, NAD, lying comfortably in bed  HEENT: NC/AT  Respiratory: nonlabored breathing  Cardiovascular: RRR, normal S1 and S2, no murmurs or gallops  Abdomen: non-distended, soft, non-tender  Extremities: WWP, no edema  Drain: wound VAC holding suction  --------------------------------------------------------------------------------------------------  V/S:  Vital Signs Last 24 Hrs  T(C): 36.6 (22 Feb 2020 12:29), Max: 37 (22 Feb 2020 00:55)  T(F): 97.9 (22 Feb 2020 12:29), Max: 98.6 (22 Feb 2020 00:55)  HR: 81 (22 Feb 2020 12:29) (75 - 87)  BP: 111/76 (22 Feb 2020 12:29) (111/76 - 139/72)  BP(mean): --  RR: 18 (22 Feb 2020 12:29) (18 - 18)  SpO2: 95% (22 Feb 2020 12:29) (95% - 97%)    --------------------------------------------------------------------------------------------------  I/Os:    21 Feb 2020 07:01  -  22 Feb 2020 07:00  --------------------------------------------------------  IN:    Oral Fluid: 1000 mL  Total IN: 1000 mL    OUT:    Indwelling Catheter - Urethral: 500 mL    Voided: 750 mL  Total OUT: 1250 mL    Total NET: -250 mL      22 Feb 2020 07:01  -  22 Feb 2020 16:29  --------------------------------------------------------  IN:    Oral Fluid: 740 mL  Total IN: 740 mL    OUT:    Voided: 800 mL  Total OUT: 800 mL    Total NET: -60 mL        --------------------------------------------------------------------------------------------------  LABS:                        10.5   16.56 )-----------( 314      ( 22 Feb 2020 08:58 )             33.5     22 Feb 2020 14:30    136    |  101    |  29     ----------------------------<  145    5.4     |  27     |  1.52     Ca    8.4        22 Feb 2020 14:30  Phos  3.2       22 Feb 2020 08:58  Mg     2.3       22 Feb 2020 08:58  --------------------------------------------------------------------------------------------------  MEDICATIONS  (STANDING):  heparin  Injectable 5000 Unit(s) SubCutaneous every 8 hours  simvastatin 20 milliGRAM(s) Oral at bedtime  sodium chloride 0.9% lock flush 3 milliLiter(s) IV Push every 8 hours  sodium chloride 0.9%. 1000 milliLiter(s) (60 mL/Hr) IV Continuous <Continuous>    MEDICATIONS  (PRN):  oxyCODONE    IR 5 milliGRAM(s) Oral every 4 hours PRN Moderate Pain (4 - 6)  oxyCODONE    IR 10 milliGRAM(s) Oral every 4 hours PRN Severe Pain (7 - 10)

## 2020-02-23 LAB
ANION GAP SERPL CALC-SCNC: 10 MMOL/L — SIGNIFICANT CHANGE UP (ref 5–17)
BUN SERPL-MCNC: 18 MG/DL — SIGNIFICANT CHANGE UP (ref 7–23)
CALCIUM SERPL-MCNC: 8.6 MG/DL — SIGNIFICANT CHANGE UP (ref 8.4–10.5)
CHLORIDE SERPL-SCNC: 104 MMOL/L — SIGNIFICANT CHANGE UP (ref 96–108)
CO2 SERPL-SCNC: 23 MMOL/L — SIGNIFICANT CHANGE UP (ref 22–31)
CREAT SERPL-MCNC: 1.23 MG/DL — SIGNIFICANT CHANGE UP (ref 0.5–1.3)
GLUCOSE SERPL-MCNC: 122 MG/DL — HIGH (ref 70–99)
HCT VFR BLD CALC: 33.4 % — LOW (ref 34.5–45)
HGB BLD-MCNC: 10.4 G/DL — LOW (ref 11.5–15.5)
MAGNESIUM SERPL-MCNC: 2 MG/DL — SIGNIFICANT CHANGE UP (ref 1.6–2.6)
MCHC RBC-ENTMCNC: 26.7 PG — LOW (ref 27–34)
MCHC RBC-ENTMCNC: 31.1 GM/DL — LOW (ref 32–36)
MCV RBC AUTO: 85.9 FL — SIGNIFICANT CHANGE UP (ref 80–100)
NRBC # BLD: 0 /100 WBCS — SIGNIFICANT CHANGE UP (ref 0–0)
PHOSPHATE SERPL-MCNC: 1.9 MG/DL — LOW (ref 2.5–4.5)
PLATELET # BLD AUTO: 292 K/UL — SIGNIFICANT CHANGE UP (ref 150–400)
POTASSIUM SERPL-MCNC: 4.4 MMOL/L — SIGNIFICANT CHANGE UP (ref 3.5–5.3)
POTASSIUM SERPL-SCNC: 4.4 MMOL/L — SIGNIFICANT CHANGE UP (ref 3.5–5.3)
RBC # BLD: 3.89 M/UL — SIGNIFICANT CHANGE UP (ref 3.8–5.2)
RBC # FLD: 15.7 % — HIGH (ref 10.3–14.5)
SODIUM SERPL-SCNC: 137 MMOL/L — SIGNIFICANT CHANGE UP (ref 135–145)
WBC # BLD: 14.44 K/UL — HIGH (ref 3.8–10.5)
WBC # FLD AUTO: 14.44 K/UL — HIGH (ref 3.8–10.5)

## 2020-02-23 RX ORDER — SODIUM,POTASSIUM PHOSPHATES 278-250MG
1 POWDER IN PACKET (EA) ORAL ONCE
Refills: 0 | Status: COMPLETED | OUTPATIENT
Start: 2020-02-23 | End: 2020-02-23

## 2020-02-23 RX ADMIN — SODIUM CHLORIDE 3 MILLILITER(S): 9 INJECTION INTRAMUSCULAR; INTRAVENOUS; SUBCUTANEOUS at 22:32

## 2020-02-23 RX ADMIN — SODIUM CHLORIDE 3 MILLILITER(S): 9 INJECTION INTRAMUSCULAR; INTRAVENOUS; SUBCUTANEOUS at 13:45

## 2020-02-23 RX ADMIN — Medication 975 MILLIGRAM(S): at 17:07

## 2020-02-23 RX ADMIN — HEPARIN SODIUM 5000 UNIT(S): 5000 INJECTION INTRAVENOUS; SUBCUTANEOUS at 22:33

## 2020-02-23 RX ADMIN — HEPARIN SODIUM 5000 UNIT(S): 5000 INJECTION INTRAVENOUS; SUBCUTANEOUS at 13:46

## 2020-02-23 RX ADMIN — Medication 975 MILLIGRAM(S): at 17:37

## 2020-02-23 RX ADMIN — HEPARIN SODIUM 5000 UNIT(S): 5000 INJECTION INTRAVENOUS; SUBCUTANEOUS at 06:28

## 2020-02-23 RX ADMIN — Medication 1 PACKET(S): at 12:29

## 2020-02-23 RX ADMIN — Medication 975 MILLIGRAM(S): at 11:21

## 2020-02-23 RX ADMIN — SODIUM CHLORIDE 3 MILLILITER(S): 9 INJECTION INTRAMUSCULAR; INTRAVENOUS; SUBCUTANEOUS at 05:45

## 2020-02-23 RX ADMIN — SODIUM CHLORIDE 60 MILLILITER(S): 9 INJECTION INTRAMUSCULAR; INTRAVENOUS; SUBCUTANEOUS at 08:01

## 2020-02-23 RX ADMIN — OXYCODONE HYDROCHLORIDE 10 MILLIGRAM(S): 5 TABLET ORAL at 06:10

## 2020-02-23 RX ADMIN — OXYCODONE HYDROCHLORIDE 10 MILLIGRAM(S): 5 TABLET ORAL at 06:40

## 2020-02-23 RX ADMIN — SIMVASTATIN 20 MILLIGRAM(S): 20 TABLET, FILM COATED ORAL at 22:33

## 2020-02-23 RX ADMIN — Medication 975 MILLIGRAM(S): at 11:50

## 2020-02-23 NOTE — PROGRESS NOTE ADULT - SUBJECTIVE AND OBJECTIVE BOX
No acute events overnight    SUBJECTIVE:      OBJECTIVE:  Vital Signs Last 24 Hrs  T(C): 37 (23 Feb 2020 00:55), Max: 37.2 (22 Feb 2020 17:13)  T(F): 98.6 (23 Feb 2020 00:55), Max: 99 (22 Feb 2020 17:13)  HR: 100 (23 Feb 2020 00:55) (75 - 100)  BP: 111/72 (23 Feb 2020 00:55) (111/72 - 132/78)  BP(mean): --  RR: 18 (23 Feb 2020 00:55) (18 - 18)  SpO2: 95% (23 Feb 2020 00:55) (95% - 96%)      Physical Examination:  General: AAOx3, NAD, lying comfortably in bed  HEENT: NC/AT  Respiratory: nonlabored breathing  Cardiovascular: RRR, normal S1 and S2, no murmurs or gallops  Abdomen: non-distended, soft, non-tender  Extremities: WWP, no edema  Drain: wound VAC holding suction    LABS:                        10.5   16.56 )-----------( 314      ( 22 Feb 2020 08:58 )             33.5       02-22    137  |  101  |  28<H>  ----------------------------<  159<H>  5.0   |  24  |  1.44<H>    Ca    8.5      22 Feb 2020 16:57  Phos  3.2     02-22  Mg     2.3     02-22 No acute events overnight    SUBJECTIVE:  - Patient seen and examined at bedside   - Patient states feeling well  - Denies abdominal pain, N/V, chest pain, SOB    OBJECTIVE:  Vital Signs Last 24 Hrs  T(C): 37 (23 Feb 2020 00:55), Max: 37.2 (22 Feb 2020 17:13)  T(F): 98.6 (23 Feb 2020 00:55), Max: 99 (22 Feb 2020 17:13)  HR: 100 (23 Feb 2020 00:55) (75 - 100)  BP: 111/72 (23 Feb 2020 00:55) (111/72 - 132/78)  BP(mean): --  RR: 18 (23 Feb 2020 00:55) (18 - 18)  SpO2: 95% (23 Feb 2020 00:55) (95% - 96%)      Physical Examination:  General: AAOx3, NAD, lying comfortably in bed  HEENT: NC/AT  Respiratory: nonlabored breathing  Cardiovascular: RRR, normal S1 and S2, no murmurs or gallops  Abdomen: non-distended, soft, non-tender  Extremities: WWP, no edema  Drain: wound VAC holding suction    LABS:                        10.5   16.56 )-----------( 314      ( 22 Feb 2020 08:58 )             33.5       02-22    137  |  101  |  28<H>  ----------------------------<  159<H>  5.0   |  24  |  1.44<H>    Ca    8.5      22 Feb 2020 16:57  Phos  3.2     02-22  Mg     2.3     02-22

## 2020-02-23 NOTE — PROGRESS NOTE ADULT - ASSESSMENT
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 Patient now s/p laparoscopic anterior resection, SBR w/ anastomosis 2/20.    Plan:  ERAS protocol   - CLD   - OOB/ambi/IS  - VTE PPx: SCDs, OOB, AYALA  - Monitor electrolytes    Red Surgery  [id number]. 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 Patient now s/p laparoscopic anterior resection, SBR w/ anastomosis 2/20.    Plan:  ERAS protocol   - LRD   - OOB/ambi/IS  - VTE PPx: SCDs, OOB, AYALA  - Monitor electrolytes    Red Surgery  [id number].

## 2020-02-24 LAB
ANION GAP SERPL CALC-SCNC: 12 MMOL/L — SIGNIFICANT CHANGE UP (ref 5–17)
BUN SERPL-MCNC: 12 MG/DL — SIGNIFICANT CHANGE UP (ref 7–23)
CALCIUM SERPL-MCNC: 9.2 MG/DL — SIGNIFICANT CHANGE UP (ref 8.4–10.5)
CHLORIDE SERPL-SCNC: 104 MMOL/L — SIGNIFICANT CHANGE UP (ref 96–108)
CO2 SERPL-SCNC: 26 MMOL/L — SIGNIFICANT CHANGE UP (ref 22–31)
CREAT SERPL-MCNC: 1.11 MG/DL — SIGNIFICANT CHANGE UP (ref 0.5–1.3)
GLUCOSE SERPL-MCNC: 106 MG/DL — HIGH (ref 70–99)
HCT VFR BLD CALC: 32.2 % — LOW (ref 34.5–45)
HGB BLD-MCNC: 9.5 G/DL — LOW (ref 11.5–15.5)
MAGNESIUM SERPL-MCNC: 1.8 MG/DL — SIGNIFICANT CHANGE UP (ref 1.6–2.6)
MCHC RBC-ENTMCNC: 26.1 PG — LOW (ref 27–34)
MCHC RBC-ENTMCNC: 29.5 GM/DL — LOW (ref 32–36)
MCV RBC AUTO: 88.5 FL — SIGNIFICANT CHANGE UP (ref 80–100)
NRBC # BLD: 0 /100 WBCS — SIGNIFICANT CHANGE UP (ref 0–0)
PHOSPHATE SERPL-MCNC: 2.8 MG/DL — SIGNIFICANT CHANGE UP (ref 2.5–4.5)
PLATELET # BLD AUTO: 285 K/UL — SIGNIFICANT CHANGE UP (ref 150–400)
POTASSIUM SERPL-MCNC: 4.7 MMOL/L — SIGNIFICANT CHANGE UP (ref 3.5–5.3)
POTASSIUM SERPL-SCNC: 4.7 MMOL/L — SIGNIFICANT CHANGE UP (ref 3.5–5.3)
RBC # BLD: 3.64 M/UL — LOW (ref 3.8–5.2)
RBC # FLD: 15.8 % — HIGH (ref 10.3–14.5)
SODIUM SERPL-SCNC: 142 MMOL/L — SIGNIFICANT CHANGE UP (ref 135–145)
WBC # BLD: 13.56 K/UL — HIGH (ref 3.8–10.5)
WBC # FLD AUTO: 13.56 K/UL — HIGH (ref 3.8–10.5)

## 2020-02-24 RX ORDER — IBUPROFEN 200 MG
600 TABLET ORAL EVERY 6 HOURS
Refills: 0 | Status: DISCONTINUED | OUTPATIENT
Start: 2020-02-24 | End: 2020-02-24

## 2020-02-24 RX ORDER — MAGNESIUM SULFATE 500 MG/ML
2 VIAL (ML) INJECTION ONCE
Refills: 0 | Status: COMPLETED | OUTPATIENT
Start: 2020-02-24 | End: 2020-02-24

## 2020-02-24 RX ORDER — SODIUM,POTASSIUM PHOSPHATES 278-250MG
1 POWDER IN PACKET (EA) ORAL ONCE
Refills: 0 | Status: COMPLETED | OUTPATIENT
Start: 2020-02-24 | End: 2020-02-24

## 2020-02-24 RX ADMIN — SIMVASTATIN 20 MILLIGRAM(S): 20 TABLET, FILM COATED ORAL at 22:32

## 2020-02-24 RX ADMIN — SODIUM CHLORIDE 3 MILLILITER(S): 9 INJECTION INTRAMUSCULAR; INTRAVENOUS; SUBCUTANEOUS at 21:41

## 2020-02-24 RX ADMIN — Medication 1 PACKET(S): at 10:11

## 2020-02-24 RX ADMIN — OXYCODONE HYDROCHLORIDE 10 MILLIGRAM(S): 5 TABLET ORAL at 10:11

## 2020-02-24 RX ADMIN — SODIUM CHLORIDE 3 MILLILITER(S): 9 INJECTION INTRAMUSCULAR; INTRAVENOUS; SUBCUTANEOUS at 05:55

## 2020-02-24 RX ADMIN — OXYCODONE HYDROCHLORIDE 10 MILLIGRAM(S): 5 TABLET ORAL at 00:16

## 2020-02-24 RX ADMIN — HEPARIN SODIUM 5000 UNIT(S): 5000 INJECTION INTRAVENOUS; SUBCUTANEOUS at 22:32

## 2020-02-24 RX ADMIN — SODIUM CHLORIDE 3 MILLILITER(S): 9 INJECTION INTRAMUSCULAR; INTRAVENOUS; SUBCUTANEOUS at 13:03

## 2020-02-24 RX ADMIN — OXYCODONE HYDROCHLORIDE 10 MILLIGRAM(S): 5 TABLET ORAL at 22:37

## 2020-02-24 RX ADMIN — OXYCODONE HYDROCHLORIDE 10 MILLIGRAM(S): 5 TABLET ORAL at 10:13

## 2020-02-24 RX ADMIN — OXYCODONE HYDROCHLORIDE 10 MILLIGRAM(S): 5 TABLET ORAL at 16:18

## 2020-02-24 RX ADMIN — OXYCODONE HYDROCHLORIDE 10 MILLIGRAM(S): 5 TABLET ORAL at 00:46

## 2020-02-24 RX ADMIN — SODIUM CHLORIDE 60 MILLILITER(S): 9 INJECTION INTRAMUSCULAR; INTRAVENOUS; SUBCUTANEOUS at 00:18

## 2020-02-24 RX ADMIN — OXYCODONE HYDROCHLORIDE 10 MILLIGRAM(S): 5 TABLET ORAL at 16:20

## 2020-02-24 RX ADMIN — Medication 50 GRAM(S): at 12:39

## 2020-02-24 RX ADMIN — HEPARIN SODIUM 5000 UNIT(S): 5000 INJECTION INTRAVENOUS; SUBCUTANEOUS at 13:02

## 2020-02-24 RX ADMIN — OXYCODONE HYDROCHLORIDE 10 MILLIGRAM(S): 5 TABLET ORAL at 23:07

## 2020-02-24 RX ADMIN — HEPARIN SODIUM 5000 UNIT(S): 5000 INJECTION INTRAVENOUS; SUBCUTANEOUS at 05:57

## 2020-02-24 NOTE — PROGRESS NOTE ADULT - ASSESSMENT
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 Patient now s/p laparoscopic anterior resection, SBR w/ anastomosis 2/20.    PLAN:  - VAC change  - LRD/IVL  - OOB/ambi/IS  - VTE PPx: SCDs, OOB, AYALA  - Monitor electrolytes    Red Surgery  [id number].

## 2020-02-24 NOTE — PROGRESS NOTE ADULT - SUBJECTIVE AND OBJECTIVE BOX
Surgery Daily Progress Note    No acute events overnight    SUBJECTIVE:  - Patient seen and examined at bedside   - Patient states feeling well  - Denies abdominal pain, N/V, chest pain, SOB    OBJECTIVE:  Physical Examination:  General: AAOx3, NAD, lying comfortably in bed  HEENT: NC/AT  Respiratory: nonlabored breathing  Cardiovascular: RRR, normal S1 and S2, no murmurs or gallops  Abdomen: non-distended, soft, non-tender  Extremities: WWP, no edema  Drain: wound VAC holding suction    V/S:  Vital Signs Last 24 Hrs  T(C): 37.1 (24 Feb 2020 09:21), Max: 37.3 (23 Feb 2020 21:20)  T(F): 98.8 (24 Feb 2020 09:21), Max: 99.1 (23 Feb 2020 21:20)  HR: 97 (24 Feb 2020 09:21) (88 - 99)  BP: 146/84 (24 Feb 2020 09:21) (118/69 - 150/84)  BP(mean): --  RR: 18 (24 Feb 2020 09:21) (18 - 18)  SpO2: 98% (24 Feb 2020 09:21) (94% - 98%)    --------------------------------------------------------------------------------------------------  I/Os:    23 Feb 2020 07:01  -  24 Feb 2020 07:00  --------------------------------------------------------  IN:    Oral Fluid: 962 mL    sodium chloride 0.9%: 1480 mL  Total IN: 2442 mL    OUT:    Voided: 2350 mL  Total OUT: 2350 mL    Total NET: 92 mL      24 Feb 2020 07:01  -  24 Feb 2020 10:01  --------------------------------------------------------  IN:    Oral Fluid: 240 mL  Total IN: 240 mL    OUT:    Voided: 400 mL  Total OUT: 400 mL    Total NET: -160 mL        --------------------------------------------------------------------------------------------------  LABS:                        9.5    13.56 )-----------( 285      ( 24 Feb 2020 07:30 )             32.2     24 Feb 2020 07:20    142    |  104    |  12     ----------------------------<  106    4.7     |  26     |  1.11     Ca    9.2        24 Feb 2020 07:20  Phos  2.8       24 Feb 2020 07:20  Mg     1.8       24 Feb 2020 07:20    --------------------------------------------------------------------------------------------------  MEDICATIONS  (STANDING):  acetaminophen   Tablet .. 975 milliGRAM(s) Oral every 6 hours  heparin  Injectable 5000 Unit(s) SubCutaneous every 8 hours  magnesium sulfate  IVPB 2 Gram(s) IV Intermittent once  potassium phosphate / sodium phosphate powder 1 Packet(s) Oral once  simvastatin 20 milliGRAM(s) Oral at bedtime  sodium chloride 0.9% lock flush 3 milliLiter(s) IV Push every 8 hours    MEDICATIONS  (PRN):  oxyCODONE    IR 5 milliGRAM(s) Oral every 4 hours PRN Moderate Pain (4 - 6)  oxyCODONE    IR 10 milliGRAM(s) Oral every 4 hours PRN Severe Pain (7 - 10)

## 2020-02-24 NOTE — PHYSICAL THERAPY INITIAL EVALUATION ADULT - MODALITIES TREATMENT COMMENTS
PT wound care order received for VAC dressing change to abd s/p SBR, senthil session well, wound received C/D/I, measured: 22.0cmx1.0cmx5.0cm, no erythema, no purulent, no malodor, cleansed with NS, cavilon to periwound, 100 mmHg continuous with white foam luis followed by adaptic / black foam bridge,

## 2020-02-25 ENCOUNTER — TRANSCRIPTION ENCOUNTER (OUTPATIENT)
Age: 75
End: 2020-02-25

## 2020-02-25 VITALS
TEMPERATURE: 99 F | SYSTOLIC BLOOD PRESSURE: 151 MMHG | DIASTOLIC BLOOD PRESSURE: 81 MMHG | RESPIRATION RATE: 18 BRPM | HEART RATE: 97 BPM | OXYGEN SATURATION: 94 %

## 2020-02-25 LAB
ANION GAP SERPL CALC-SCNC: 9 MMOL/L — SIGNIFICANT CHANGE UP (ref 5–17)
APPEARANCE UR: ABNORMAL
BACTERIA # UR AUTO: ABNORMAL
BILIRUB UR-MCNC: ABNORMAL
BUN SERPL-MCNC: 9 MG/DL — SIGNIFICANT CHANGE UP (ref 7–23)
CALCIUM SERPL-MCNC: 9.1 MG/DL — SIGNIFICANT CHANGE UP (ref 8.4–10.5)
CHLORIDE SERPL-SCNC: 97 MMOL/L — SIGNIFICANT CHANGE UP (ref 96–108)
CO2 SERPL-SCNC: 27 MMOL/L — SIGNIFICANT CHANGE UP (ref 22–31)
COLOR SPEC: ABNORMAL
COMMENT - URINE: SIGNIFICANT CHANGE UP
CREAT SERPL-MCNC: 0.96 MG/DL — SIGNIFICANT CHANGE UP (ref 0.5–1.3)
DIFF PNL FLD: ABNORMAL
EPI CELLS # UR: 0 /HPF — SIGNIFICANT CHANGE UP (ref 0–5)
GLUCOSE SERPL-MCNC: 123 MG/DL — HIGH (ref 70–99)
GLUCOSE UR QL: NEGATIVE — SIGNIFICANT CHANGE UP
HCT VFR BLD CALC: 30.1 % — LOW (ref 34.5–45)
HGB BLD-MCNC: 9.1 G/DL — LOW (ref 11.5–15.5)
KETONES UR-MCNC: NEGATIVE — SIGNIFICANT CHANGE UP
LEUKOCYTE ESTERASE UR-ACNC: NEGATIVE — SIGNIFICANT CHANGE UP
MAGNESIUM SERPL-MCNC: 1.8 MG/DL — SIGNIFICANT CHANGE UP (ref 1.6–2.6)
MCHC RBC-ENTMCNC: 26.1 PG — LOW (ref 27–34)
MCHC RBC-ENTMCNC: 30.2 GM/DL — LOW (ref 32–36)
MCV RBC AUTO: 86.5 FL — SIGNIFICANT CHANGE UP (ref 80–100)
NITRITE UR-MCNC: POSITIVE
NRBC # BLD: 0 /100 WBCS — SIGNIFICANT CHANGE UP (ref 0–0)
PH UR: 6 — SIGNIFICANT CHANGE UP (ref 5–8)
PHOSPHATE SERPL-MCNC: 2.9 MG/DL — SIGNIFICANT CHANGE UP (ref 2.5–4.5)
PLATELET # BLD AUTO: 322 K/UL — SIGNIFICANT CHANGE UP (ref 150–400)
POTASSIUM SERPL-MCNC: 4.2 MMOL/L — SIGNIFICANT CHANGE UP (ref 3.5–5.3)
POTASSIUM SERPL-SCNC: 4.2 MMOL/L — SIGNIFICANT CHANGE UP (ref 3.5–5.3)
PROT UR-MCNC: ABNORMAL
RBC # BLD: 3.48 M/UL — LOW (ref 3.8–5.2)
RBC # FLD: 15.5 % — HIGH (ref 10.3–14.5)
RBC CASTS # UR COMP ASSIST: >720 /HPF — HIGH (ref 0–4)
SODIUM SERPL-SCNC: 133 MMOL/L — LOW (ref 135–145)
SP GR SPEC: 1.02 — SIGNIFICANT CHANGE UP (ref 1.01–1.02)
UROBILINOGEN FLD QL: SIGNIFICANT CHANGE UP
WBC # BLD: 11.36 K/UL — HIGH (ref 3.8–10.5)
WBC # FLD AUTO: 11.36 K/UL — HIGH (ref 3.8–10.5)
WBC UR QL: 7 /HPF — HIGH (ref 0–5)

## 2020-02-25 PROCEDURE — 93005 ELECTROCARDIOGRAM TRACING: CPT

## 2020-02-25 PROCEDURE — 84100 ASSAY OF PHOSPHORUS: CPT

## 2020-02-25 PROCEDURE — 80048 BASIC METABOLIC PNL TOTAL CA: CPT

## 2020-02-25 PROCEDURE — 81001 URINALYSIS AUTO W/SCOPE: CPT

## 2020-02-25 PROCEDURE — 83735 ASSAY OF MAGNESIUM: CPT

## 2020-02-25 PROCEDURE — 82962 GLUCOSE BLOOD TEST: CPT

## 2020-02-25 PROCEDURE — 97162 PT EVAL MOD COMPLEX 30 MIN: CPT

## 2020-02-25 PROCEDURE — 88307 TISSUE EXAM BY PATHOLOGIST: CPT

## 2020-02-25 PROCEDURE — 85027 COMPLETE CBC AUTOMATED: CPT

## 2020-02-25 PROCEDURE — C1889: CPT

## 2020-02-25 PROCEDURE — C1758: CPT

## 2020-02-25 RX ORDER — ACETAMINOPHEN 500 MG
1 TABLET ORAL
Qty: 0 | Refills: 0 | DISCHARGE

## 2020-02-25 RX ORDER — MAGNESIUM SULFATE 500 MG/ML
2 VIAL (ML) INJECTION ONCE
Refills: 0 | Status: COMPLETED | OUTPATIENT
Start: 2020-02-25 | End: 2020-02-25

## 2020-02-25 RX ORDER — ACETAMINOPHEN 500 MG
2 TABLET ORAL
Qty: 0 | Refills: 0 | DISCHARGE

## 2020-02-25 RX ORDER — MOXIFLOXACIN HYDROCHLORIDE TABLETS, 400 MG 400 MG/1
1 TABLET, FILM COATED ORAL
Qty: 14 | Refills: 0
Start: 2020-02-25 | End: 2020-03-02

## 2020-02-25 RX ADMIN — SODIUM CHLORIDE 3 MILLILITER(S): 9 INJECTION INTRAMUSCULAR; INTRAVENOUS; SUBCUTANEOUS at 04:48

## 2020-02-25 RX ADMIN — SODIUM CHLORIDE 3 MILLILITER(S): 9 INJECTION INTRAMUSCULAR; INTRAVENOUS; SUBCUTANEOUS at 14:00

## 2020-02-25 RX ADMIN — HEPARIN SODIUM 5000 UNIT(S): 5000 INJECTION INTRAVENOUS; SUBCUTANEOUS at 04:59

## 2020-02-25 RX ADMIN — OXYCODONE HYDROCHLORIDE 10 MILLIGRAM(S): 5 TABLET ORAL at 05:27

## 2020-02-25 RX ADMIN — OXYCODONE HYDROCHLORIDE 10 MILLIGRAM(S): 5 TABLET ORAL at 04:57

## 2020-02-25 RX ADMIN — Medication 50 GRAM(S): at 08:39

## 2020-02-25 RX ADMIN — OXYCODONE HYDROCHLORIDE 10 MILLIGRAM(S): 5 TABLET ORAL at 13:58

## 2020-02-25 NOTE — DISCHARGE NOTE NURSING/CASE MANAGEMENT/SOCIAL WORK - NSDCPEPTSTRK_GEN_ALL_CORE
Stroke support groups for patients, families, and friends/Stroke warning signs and symptoms/Call 911 for stroke/Need for follow up after discharge/Prescribed medications/Stroke education booklet/Risk factors for stroke/Signs and symptoms of stroke

## 2020-02-25 NOTE — PROGRESS NOTE ADULT - SUBJECTIVE AND OBJECTIVE BOX
S: Patient doing well, no acute complaints. Tolerating LRD with no nausea/vomiting.     O: Vital Signs  T(C): 36.9 (02-25 @ 09:00), Max: 37.3 (02-24 @ 21:11)  HR: 90 (02-25 @ 09:00) (87 - 103)  BP: 157/75 (02-25 @ 09:00) (133/73 - 157/75)  RR: 18 (02-25 @ 09:00) (18 - 18)  SpO2: 95% (02-25 @ 09:00) (92% - 98%)  02-24-20 @ 07:01  -  02-25-20 @ 07:00  --------------------------------------------------------  IN: 1150 mL / OUT: 1650 mL / NET: -500 mL    02-25-20 @ 07:01  -  02-25-20 @ 12:30  --------------------------------------------------------  IN: 150 mL / OUT: 0 mL / NET: 150 mL      General: alert and oriented, NAD  Resp: airway patent, respirations unlabored  CVS: regular rate and rhythm  Abdomen: soft, nontender, nondistended, vac in place with good suction  Extremities: no edema  Skin: warm, dry, appropriate color                          9.1    11.36 )-----------( 322      ( 25 Feb 2020 06:04 )             30.1   02-25    133<L>  |  97  |  9   ----------------------------<  123<H>  4.2   |  27  |  0.96    Ca    9.1      25 Feb 2020 06:04  Phos  2.9     02-25  Mg     1.8     02-25

## 2020-02-25 NOTE — DISCHARGE NOTE NURSING/CASE MANAGEMENT/SOCIAL WORK - PATIENT PORTAL LINK FT
You can access the FollowMyHealth Patient Portal offered by [facility] by registering at the following website: [email or web address]. By joining [employer]’s FollowMyHealth portal, you will also be able to view your health information using other applications (apps) compatible with our system.

## 2020-02-25 NOTE — PROGRESS NOTE ADULT - ASSESSMENT
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 Patient now s/p laparoscopic anterior resection, SBR w/ anastomosis 2/20.    PLAN:  - VAC change  - Continue LRD  - OOB/ambi/IS  - VTE PPx: SCDs, OOB, AYALA  - Pain control  - Dispo planning: Home w/home vac    Red Surgery  [id number].

## 2020-02-26 LAB — SURGICAL PATHOLOGY STUDY: SIGNIFICANT CHANGE UP

## 2020-03-04 ENCOUNTER — APPOINTMENT (OUTPATIENT)
Dept: COLORECTAL SURGERY | Facility: CLINIC | Age: 75
End: 2020-03-04
Payer: MEDICARE

## 2020-03-04 PROCEDURE — 99024 POSTOP FOLLOW-UP VISIT: CPT

## 2020-03-04 NOTE — HISTORY OF PRESENT ILLNESS
[FreeTextEntry1] : 74-year-old female Rastafarian status post open anterior resection for diverticulitis. I also performed a small bowel  resection during the procedure. Only complaint is pain in the lower midline wound. She is tolerating diet and as a VAC in place.

## 2020-03-04 NOTE — ASSESSMENT
[FreeTextEntry1] : 74 year old female recovering well other major abdominal surgery. I do not believe she needs a VAC dressing and a more.\par \par I replaced the vacuum dressing with one-inch plain packing

## 2020-03-04 NOTE — PHYSICAL EXAM
[Exam Deferred] : exam was deferred [de-identified] : Note that bleedingWound healing well, good granulation tissue in the base, back dressing removed.1 inch plain packing replaced.

## 2020-03-11 ENCOUNTER — APPOINTMENT (OUTPATIENT)
Dept: COLORECTAL SURGERY | Facility: CLINIC | Age: 75
End: 2020-03-11
Payer: MEDICARE

## 2020-03-11 DIAGNOSIS — E66.9 OBESITY, UNSPECIFIED: ICD-10-CM

## 2020-03-11 PROCEDURE — 99024 POSTOP FOLLOW-UP VISIT: CPT

## 2020-03-11 PROCEDURE — 17250 CHEM CAUT OF GRANLTJ TISSUE: CPT | Mod: 78

## 2020-03-11 NOTE — HISTORY OF PRESENT ILLNESS
[FreeTextEntry1] :  74-year-old female here for her second postoperative visit after undergoing an open anterior resection. She reports having good bowel function although slightly more narrow than normal. Otherwise she reports positive recovery

## 2020-03-11 NOTE — PROCEDURE
[FreeTextEntry1] : Packing changed, chemical cauterization of granulation tissue silver nitrate, most of the staples were removed. There are 3 staples Left

## 2020-03-16 ENCOUNTER — APPOINTMENT (OUTPATIENT)
Dept: COLORECTAL SURGERY | Facility: CLINIC | Age: 75
End: 2020-03-16
Payer: MEDICARE

## 2020-03-16 PROCEDURE — 17250 CHEM CAUT OF GRANLTJ TISSUE: CPT | Mod: 78

## 2020-03-16 PROCEDURE — 99024 POSTOP FOLLOW-UP VISIT: CPT

## 2020-04-08 NOTE — ED ADULT TRIAGE NOTE - HEIGHT IN INCHES
Admission Reconciliation is Not Complete  Discharge Reconciliation is Not Complete Admission Reconciliation is Completed  Discharge Reconciliation is Not Complete 5 Admission Reconciliation is Completed  Discharge Reconciliation is Completed

## 2020-04-27 ENCOUNTER — APPOINTMENT (OUTPATIENT)
Dept: COLORECTAL SURGERY | Facility: CLINIC | Age: 75
End: 2020-04-27
Payer: MEDICARE

## 2020-04-27 DIAGNOSIS — K57.92 DIVERTICULITIS OF INTESTINE, PART UNSPECIFIED, W/OUT PERFORATION OR ABSCESS W/OUT BLEEDING: ICD-10-CM

## 2020-04-27 PROCEDURE — 17250 CHEM CAUT OF GRANLTJ TISSUE: CPT | Mod: 78

## 2020-04-27 PROCEDURE — 99024 POSTOP FOLLOW-UP VISIT: CPT

## 2020-04-27 NOTE — ASSESSMENT
[FreeTextEntry1] : 74-year-old female status post anterior resection for diverticulitis recovering well.

## 2020-04-27 NOTE — HISTORY OF PRESENT ILLNESS
[FreeTextEntry1] : 74-year-old female status post anterior resection for diverticulitis recovering well

## 2021-01-16 NOTE — PATIENT PROFILE ADULT - [facility] [employer]
Gastroenterology Gastroenterology Gastroenterology Internal Medicine Internal Medicine Internal Medicine (4) no limitation

## 2021-03-05 NOTE — ED PROVIDER NOTE - NSTIMEPROVIDERCAREINITIATE_GEN_ER
Pt declined to schedule at this time.  She would need a late afternoon appt, but nothing is available.  She also questioned if a follow up appt is necessary.  Please call her at [phone].   15-Sep-2017 14:52

## 2021-06-07 ENCOUNTER — EMERGENCY (EMERGENCY)
Facility: HOSPITAL | Age: 76
LOS: 1 days | Discharge: HOME | End: 2021-06-07
Attending: EMERGENCY MEDICINE
Payer: MEDICARE

## 2021-06-07 VITALS
DIASTOLIC BLOOD PRESSURE: 79 MMHG | HEART RATE: 86 BPM | RESPIRATION RATE: 17 BRPM | OXYGEN SATURATION: 100 % | SYSTOLIC BLOOD PRESSURE: 157 MMHG

## 2021-06-07 VITALS
HEIGHT: 64 IN | WEIGHT: 242.07 LBS | DIASTOLIC BLOOD PRESSURE: 82 MMHG | HEART RATE: 97 BPM | RESPIRATION RATE: 16 BRPM | SYSTOLIC BLOOD PRESSURE: 140 MMHG | OXYGEN SATURATION: 95 % | TEMPERATURE: 98 F

## 2021-06-07 DIAGNOSIS — E89.6 POSTPROCEDURAL ADRENOCORTICAL (-MEDULLARY) HYPOFUNCTION: Chronic | ICD-10-CM

## 2021-06-07 DIAGNOSIS — Z90.89 ACQUIRED ABSENCE OF OTHER ORGANS: Chronic | ICD-10-CM

## 2021-06-07 DIAGNOSIS — Z90.710 ACQUIRED ABSENCE OF BOTH CERVIX AND UTERUS: Chronic | ICD-10-CM

## 2021-06-07 LAB
ALBUMIN SERPL ELPH-MCNC: 4.1 G/DL — SIGNIFICANT CHANGE UP (ref 3.3–5)
ALP SERPL-CCNC: 76 U/L — SIGNIFICANT CHANGE UP (ref 40–120)
ALT FLD-CCNC: 15 U/L — SIGNIFICANT CHANGE UP (ref 10–45)
ANION GAP SERPL CALC-SCNC: 11 MMOL/L — SIGNIFICANT CHANGE UP (ref 5–17)
APPEARANCE UR: CLEAR — SIGNIFICANT CHANGE UP
AST SERPL-CCNC: 15 U/L — SIGNIFICANT CHANGE UP (ref 10–40)
BASOPHILS # BLD AUTO: 0.03 K/UL — SIGNIFICANT CHANGE UP (ref 0–0.2)
BASOPHILS NFR BLD AUTO: 0.4 % — SIGNIFICANT CHANGE UP (ref 0–2)
BILIRUB SERPL-MCNC: 0.4 MG/DL — SIGNIFICANT CHANGE UP (ref 0.2–1.2)
BILIRUB UR-MCNC: NEGATIVE — SIGNIFICANT CHANGE UP
BUN SERPL-MCNC: 14 MG/DL — SIGNIFICANT CHANGE UP (ref 7–23)
CALCIUM SERPL-MCNC: 9.3 MG/DL — SIGNIFICANT CHANGE UP (ref 8.4–10.5)
CHLORIDE SERPL-SCNC: 105 MMOL/L — SIGNIFICANT CHANGE UP (ref 96–108)
CO2 SERPL-SCNC: 23 MMOL/L — SIGNIFICANT CHANGE UP (ref 22–31)
COLOR SPEC: YELLOW — SIGNIFICANT CHANGE UP
CREAT SERPL-MCNC: 0.85 MG/DL — SIGNIFICANT CHANGE UP (ref 0.5–1.3)
DIFF PNL FLD: NEGATIVE — SIGNIFICANT CHANGE UP
EOSINOPHIL # BLD AUTO: 0.17 K/UL — SIGNIFICANT CHANGE UP (ref 0–0.5)
EOSINOPHIL NFR BLD AUTO: 2.3 % — SIGNIFICANT CHANGE UP (ref 0–6)
GLUCOSE SERPL-MCNC: 114 MG/DL — HIGH (ref 70–99)
GLUCOSE UR QL: NEGATIVE — SIGNIFICANT CHANGE UP
HCT VFR BLD CALC: 41.9 % — SIGNIFICANT CHANGE UP (ref 34.5–45)
HGB BLD-MCNC: 12.9 G/DL — SIGNIFICANT CHANGE UP (ref 11.5–15.5)
IMM GRANULOCYTES NFR BLD AUTO: 0.7 % — SIGNIFICANT CHANGE UP (ref 0–1.5)
KETONES UR-MCNC: NEGATIVE — SIGNIFICANT CHANGE UP
LEUKOCYTE ESTERASE UR-ACNC: NEGATIVE — SIGNIFICANT CHANGE UP
LYMPHOCYTES # BLD AUTO: 1.16 K/UL — SIGNIFICANT CHANGE UP (ref 1–3.3)
LYMPHOCYTES # BLD AUTO: 15.4 % — SIGNIFICANT CHANGE UP (ref 13–44)
MCHC RBC-ENTMCNC: 25.9 PG — LOW (ref 27–34)
MCHC RBC-ENTMCNC: 30.8 GM/DL — LOW (ref 32–36)
MCV RBC AUTO: 84.1 FL — SIGNIFICANT CHANGE UP (ref 80–100)
MONOCYTES # BLD AUTO: 0.84 K/UL — SIGNIFICANT CHANGE UP (ref 0–0.9)
MONOCYTES NFR BLD AUTO: 11.2 % — SIGNIFICANT CHANGE UP (ref 2–14)
NEUTROPHILS # BLD AUTO: 5.28 K/UL — SIGNIFICANT CHANGE UP (ref 1.8–7.4)
NEUTROPHILS NFR BLD AUTO: 70 % — SIGNIFICANT CHANGE UP (ref 43–77)
NITRITE UR-MCNC: NEGATIVE — SIGNIFICANT CHANGE UP
NRBC # BLD: 0 /100 WBCS — SIGNIFICANT CHANGE UP (ref 0–0)
PH UR: 5.5 — SIGNIFICANT CHANGE UP (ref 5–8)
PLATELET # BLD AUTO: 258 K/UL — SIGNIFICANT CHANGE UP (ref 150–400)
POTASSIUM SERPL-MCNC: 4.2 MMOL/L — SIGNIFICANT CHANGE UP (ref 3.5–5.3)
POTASSIUM SERPL-SCNC: 4.2 MMOL/L — SIGNIFICANT CHANGE UP (ref 3.5–5.3)
PROT SERPL-MCNC: 7.1 G/DL — SIGNIFICANT CHANGE UP (ref 6–8.3)
PROT UR-MCNC: SIGNIFICANT CHANGE UP
RBC # BLD: 4.98 M/UL — SIGNIFICANT CHANGE UP (ref 3.8–5.2)
RBC # FLD: 15.7 % — HIGH (ref 10.3–14.5)
SODIUM SERPL-SCNC: 139 MMOL/L — SIGNIFICANT CHANGE UP (ref 135–145)
SP GR SPEC: 1.03 — HIGH (ref 1.01–1.02)
UROBILINOGEN FLD QL: NEGATIVE — SIGNIFICANT CHANGE UP
WBC # BLD: 7.53 K/UL — SIGNIFICANT CHANGE UP (ref 3.8–10.5)
WBC # FLD AUTO: 7.53 K/UL — SIGNIFICANT CHANGE UP (ref 3.8–10.5)

## 2021-06-07 PROCEDURE — 81003 URINALYSIS AUTO W/O SCOPE: CPT

## 2021-06-07 PROCEDURE — 74176 CT ABD & PELVIS W/O CONTRAST: CPT

## 2021-06-07 PROCEDURE — 96374 THER/PROPH/DIAG INJ IV PUSH: CPT

## 2021-06-07 PROCEDURE — 99284 EMERGENCY DEPT VISIT MOD MDM: CPT | Mod: GC

## 2021-06-07 PROCEDURE — 80053 COMPREHEN METABOLIC PANEL: CPT

## 2021-06-07 PROCEDURE — 87086 URINE CULTURE/COLONY COUNT: CPT

## 2021-06-07 PROCEDURE — 74176 CT ABD & PELVIS W/O CONTRAST: CPT | Mod: 26,MB

## 2021-06-07 PROCEDURE — 85025 COMPLETE CBC W/AUTO DIFF WBC: CPT

## 2021-06-07 PROCEDURE — 99284 EMERGENCY DEPT VISIT MOD MDM: CPT | Mod: 25

## 2021-06-07 RX ORDER — KETOROLAC TROMETHAMINE 30 MG/ML
15 SYRINGE (ML) INJECTION ONCE
Refills: 0 | Status: DISCONTINUED | OUTPATIENT
Start: 2021-06-07 | End: 2021-06-07

## 2021-06-07 RX ORDER — SODIUM CHLORIDE 9 MG/ML
1000 INJECTION INTRAMUSCULAR; INTRAVENOUS; SUBCUTANEOUS ONCE
Refills: 0 | Status: COMPLETED | OUTPATIENT
Start: 2021-06-07 | End: 2021-06-07

## 2021-06-07 RX ADMIN — SODIUM CHLORIDE 1000 MILLILITER(S): 9 INJECTION INTRAMUSCULAR; INTRAVENOUS; SUBCUTANEOUS at 12:42

## 2021-06-07 RX ADMIN — Medication 15 MILLIGRAM(S): at 13:09

## 2021-06-07 NOTE — ED PROVIDER NOTE - PHYSICAL EXAMINATION
Gen: Obese, NAD  HEENT: EOMI, no nasal discharge, mucous membranes moist  CV: RRR, +S1/S2, no M/R/G  Resp: CTAB, no W/R/R  GI: Abdomen soft non-distended, NTTP, no masses  MSK: Left sided CVA tenderness, no open wounds, no bruising, no LE edema  Neuro: A&Ox4, following commands, moving all four extremities spontaneously  Psych: appropriate mood

## 2021-06-07 NOTE — ED ADULT NURSE NOTE - OBJECTIVE STATEMENT
75 y/o F, PMH HTN (Losartan, asa), HLD (Simvastatin), presenting to the ED c/o L flank pain, constant since Thursday, 75 y/o F, PMH HTN (Losartan, asa), HLD (Simvastatin), presenting to the ED c/o L flank pain, constant, sharp since Thursday, OTC meds attempted at home did not help, has not taken anything for pain today. No urinary sx, n/v, fevers, chills but reports pain feels similar to prior kidney stones. Safety maintained, family member at bedside. 75 y/o F, PMH HTN (Losartan, asa), HLD (Simvastatin), presenting to the ED c/o L flank pain, constant, sharp since Thursday, OTC meds attempted at home did not help, has not taken anything for pain today. No urinary sx, n/v, fevers, chills but reports pain feels similar to prior kidney stones. Safety maintained, family member at bedside. PMHx diverticulitis, CVA, DM, hypercholestermia, HTN

## 2021-06-07 NOTE — ED ADULT NURSE REASSESSMENT NOTE - NS ED NURSE REASSESS COMMENT FT1
Pt returned from XRay, resting in bed comfortably w/ VSS, appropriate side rails raised, bed locked and in lowest position, call bell within reach, pending XRay results

## 2021-06-07 NOTE — ED PROVIDER NOTE - PATIENT PORTAL LINK FT
You can access the FollowMyHealth Patient Portal offered by [facility] by registering at the following website: [email or web address]. By joining [employer]’s FollowMyHealth portal, you will also be able to view your health information using other applications (apps) compatible with our system.

## 2021-06-07 NOTE — ED ADULT NURSE NOTE - NSIMPLEMENTINTERV_GEN_ALL_ED
Implemented All Universal Safety Interventions:  [place] to call system. Call bell, personal items and telephone within reach. Instruct patient to call for assistance. Room bathroom lighting operational. Non-slip footwear when patient is off stretcher. Physically safe environment: no spills, clutter or unnecessary equipment. Stretcher in lowest position, wheels locked, appropriate side rails in place.

## 2021-06-07 NOTE — ED PROVIDER NOTE - NS ED ROS FT
Gen: Denies fever  CV: Denies chest pain, palpitations  Skin: Denies rash, erythema, color changes  Resp: Denies SOB, cough  Endo: Denies sensitivity to heat, cold, increased urination  GI: Denies diarrhea, constipation, nausea, vomiting  Msk: Denies LE swelling, extremity pain  : Denies dysuria, increased frequency  Neuro: Denies LOC, weakness, numbness/tingling

## 2021-06-07 NOTE — ED PROVIDER NOTE - OBJECTIVE STATEMENT
77 y/o female with pmhx of HTN, HLD, T2DM, prior classical hysterectomy, lap R adrenalectomy presenting with left flank pain since Thursday. Pain is localized to left flank with no radiation, described as feeling like the last time she had a kidney stone, constant, has gotten more severe since symptom onset with no associated abdominal pain, fevers/chills, n/v/d, cough, rashes, vaginal bleeding or changes in urination including hematuria, dysuria, or increased frequency. Taking Aleve for pain with no relief. also denies saddle anesthesia, bowel/bladder incontinence, weakness, or midline spinal tenderness.

## 2021-06-07 NOTE — ED PROVIDER NOTE - CLINICAL SUMMARY MEDICAL DECISION MAKING FREE TEXT BOX
77 y/o female with pmhx of HTN, HLD, T2DM, prior classical hysterectomy, lap R adrenalectomy presenting with left flank pain with no urinary or infectious symptoms described as feeling like usual kidney stone pain with L CVA tenderness. Will obtain basics, UA/UC and perform CTAP for stone hunt. No red flag symptoms for back pain. 1Ns bolus and reassess. Patient does not require pain medication at this time. 77 y/o female with pmhx of HTN, HLD, T2DM, prior classical hysterectomy, lap R adrenalectomy presenting with left flank pain with no urinary or infectious symptoms described as feeling like usual kidney stone pain with L CVA tenderness. Will obtain basics, UA/UC and perform CTAP for stone hunt. No red flag symptoms for back pain. 1Ns bolus and reassess. Patient does not require pain medication at this time.  ATTG: : flank pain worse with movement no weakness or numbness concern for but not limited to pyelo, stone, msk. check labs, check ct a.p, ivf, pain medication and re eval for dispo

## 2021-06-08 LAB
CULTURE RESULTS: SIGNIFICANT CHANGE UP
SPECIMEN SOURCE: SIGNIFICANT CHANGE UP

## 2021-11-08 NOTE — DISCHARGE NOTE PROVIDER - INSTRUCTIONS
Recommended fish oil/Omega 3 supplements. Low Residue Diet: Drink plenty of fluids with this diet as it can be constipating.

## 2022-04-01 ENCOUNTER — EMERGENCY (EMERGENCY)
Facility: HOSPITAL | Age: 77
LOS: 1 days | Discharge: HOME | End: 2022-04-01
Attending: EMERGENCY MEDICINE
Payer: MEDICARE

## 2022-04-01 VITALS
DIASTOLIC BLOOD PRESSURE: 89 MMHG | SYSTOLIC BLOOD PRESSURE: 141 MMHG | OXYGEN SATURATION: 97 % | HEART RATE: 97 BPM | TEMPERATURE: 99 F | RESPIRATION RATE: 17 BRPM

## 2022-04-01 VITALS
WEIGHT: 240.08 LBS | HEIGHT: 64 IN | DIASTOLIC BLOOD PRESSURE: 83 MMHG | RESPIRATION RATE: 19 BRPM | TEMPERATURE: 98 F | SYSTOLIC BLOOD PRESSURE: 169 MMHG | HEART RATE: 112 BPM | OXYGEN SATURATION: 97 %

## 2022-04-01 DIAGNOSIS — E89.6 POSTPROCEDURAL ADRENOCORTICAL (-MEDULLARY) HYPOFUNCTION: Chronic | ICD-10-CM

## 2022-04-01 DIAGNOSIS — Z90.710 ACQUIRED ABSENCE OF BOTH CERVIX AND UTERUS: Chronic | ICD-10-CM

## 2022-04-01 DIAGNOSIS — Z90.89 ACQUIRED ABSENCE OF OTHER ORGANS: Chronic | ICD-10-CM

## 2022-04-01 PROCEDURE — 73564 X-RAY EXAM KNEE 4 OR MORE: CPT

## 2022-04-01 PROCEDURE — 99283 EMERGENCY DEPT VISIT LOW MDM: CPT | Mod: 25

## 2022-04-01 PROCEDURE — 73564 X-RAY EXAM KNEE 4 OR MORE: CPT | Mod: 26,RT

## 2022-04-01 PROCEDURE — 99283 EMERGENCY DEPT VISIT LOW MDM: CPT | Mod: FS

## 2022-04-01 RX ORDER — LIDOCAINE 4 G/100G
1 CREAM TOPICAL ONCE
Refills: 0 | Status: COMPLETED | OUTPATIENT
Start: 2022-04-01 | End: 2022-04-01

## 2022-04-01 RX ORDER — LIDOCAINE 4 G/100G
1 CREAM TOPICAL ONCE
Refills: 0 | Status: DISCONTINUED | OUTPATIENT
Start: 2022-04-01 | End: 2022-04-01

## 2022-04-01 RX ADMIN — LIDOCAINE 1 PATCH: 4 CREAM TOPICAL at 16:28

## 2022-04-01 NOTE — ED PROVIDER NOTE - ATTENDING CONTRIBUTION TO CARE
76F hx HTN HLD ASA 81mg here s/p slip and fall 2 days ago with c/o R knee pain and swelling. Worse w mvmt and ambulation. No other injuries. R knee swollen with TTP along medial surface, slightly dec ROM, patellar tendon is palpable. Palp distal pulses. FROM in ankle and hip. No other injuries reported. Xrays to r/o fracture, suspect soft tissue injury. Immobilize, ambulatory assistive device, OP ortho FU.

## 2022-04-01 NOTE — ED ADULT TRIAGE NOTE - NSWEIGHTCALCTOOLDRUG_GEN_A_CORE
used
I will SWITCH the dose or number of times a day I take the medications listed below when I get home from the hospital:  None

## 2022-04-01 NOTE — ED PROVIDER NOTE - PHYSICAL EXAMINATION
NAD. VSS. Afebrile. +PERR, EOMI. No facial or scalp tender. Lungs clear. ABD soft, non tender. No spinal tender. No Chest wall, RIB or cva tender. No pelvic or hip tender. +Right knee generalized tender, swelling, and diminished ROMs. N/V- intact.

## 2022-04-01 NOTE — ED PROVIDER NOTE - NSFOLLOWUPINSTRUCTIONS_ED_ALL_ED_FT
Elevate the affected knee.    Take Ibuprofen or Tylenol for pain as package directed and respect warnings on the label.    Use cane for comfort.    Follow up with your orthopedist or Dr. Lemon, call Monday for appointment.    Return for any concerns or worsening symptoms.    Please see the information of knee pain and cane use.

## 2022-04-01 NOTE — ED ADULT NURSE NOTE - NSIMPLEMENTINTERV_GEN_ALL_ED
Implemented All Fall Risk Interventions:  [place] to call system. Call bell, personal items and telephone within reach. Instruct patient to call for assistance. Room bathroom lighting operational. Non-slip footwear when patient is off stretcher. Physically safe environment: no spills, clutter or unnecessary equipment. Stretcher in lowest position, wheels locked, appropriate side rails in place. Provide visual cue, wrist band, yellow gown, etc. Monitor gait and stability. Monitor for mental status changes and reorient to person, place, and time. Review medications for side effects contributing to fall risk. Reinforce activity limits and safety measures with patient and family.

## 2022-04-01 NOTE — ED PROVIDER NOTE - CARE PROVIDER_API CALL
Myron Lemon)  Orthopedics  [address] [place] [address]  Phone: [phone]  Fax: [phone]  Follow Up Time:

## 2022-04-01 NOTE — ED PROVIDER NOTE - OBJECTIVE STATEMENT
77yo female pt with PMHx of HTN, HLD, ASA 81mg daily presents to ED with right knee pain/swelling s/p mechanical fall 2days ago. Pt stated she slipped on a slippery floor at home and fell 2days ago. Denies LOC, head injury, or other injuries. She noticed gradual 75yo female pt with PMHx of HTN, HLD, ASA 81mg daily presents to ED with right knee pain/swelling s/p mechanical fall 2days ago. Pt stated she slipped on a slippery floor at home and fell 2days ago. Denies LOC, head injury, or other injuries. She noticed worsening pain with movement and walking. Denies sensory changes or weakness to extremities. Denies headache, dizziness, or visual changes. Denies neck or back pain. Denies CP/SOB/ABD pain or N/V/D. Denies pelvic or hip pain. Denies fever, chills, cough or congestion. Declined pain meds now.

## 2022-04-01 NOTE — ED ADULT NURSE NOTE - OBJECTIVE STATEMENT
76y old female PMH HTN Dm walk in from triage c/o right knee pain. A&Ox3. Patient states she slipped and fell on right knee on Wednesday, since has been having increasing difficulty ambulating on right knee and 10/10 pain. She denies head injury, or LOC. Patient is well appearing, able to move all upper and lower extremities w/ no difficulty can not move right knee w/o pain.

## 2022-04-01 NOTE — ED PROVIDER NOTE - PATIENT PORTAL LINK FT
You can access the FollowMyHealth Patient Portal offered by [facility] by registering at the following website: [email or web address]. By joining [employer]’s FollowMyHealth portal, you will also be able to view your health information using other applications (apps) compatible with our system.

## 2022-07-07 NOTE — ED PROVIDER NOTE - CHILD ABUSE FACILITY
[facility]
Detail Level: Detailed
Quality 226: Preventive Care And Screening: Tobacco Use: Screening And Cessation Intervention: Patient screened for tobacco use and is an ex/non-smoker
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
## 2023-11-24 NOTE — H&P PST ADULT - RESPIRATORY
ST segment depression
Breath Sounds equal & clear to percussion & auscultation, no accessory muscle use

## 2024-03-07 NOTE — ED ADULT NURSE NOTE - CAS TRG GEN SKIN CONDITION
Ramana Meeks Warm

## 2024-03-14 NOTE — ED ADULT TRIAGE NOTE - BP NONINVASIVE DIASTOLIC (MM HG)
Royce is calling back and stating they do not have a care group and that she does not have any trouble swallowing at all. Just some phlegm after eating. He doesn't think there is an issue with swallowing at this time. How do you suggest talking to Royce and Elizabeth? Does he get an appt or how would you like him to go about that?   86
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
## 2024-05-23 NOTE — PATIENT PROFILE ADULT - PSYCHOSOCIAL CONCERNS
Bk Tillman MD;  Location: [facility] CATH LAB;  Bry Luke III, MD  Authorized by: Bry Luke [id number]     none

## 2024-09-10 ENCOUNTER — EMERGENCY (EMERGENCY)
Facility: HOSPITAL | Age: 79
LOS: 1 days | Discharge: HOME | End: 2024-09-10
Attending: EMERGENCY MEDICINE
Payer: MEDICARE

## 2024-09-10 VITALS
HEIGHT: 65 IN | TEMPERATURE: 98 F | DIASTOLIC BLOOD PRESSURE: 80 MMHG | HEART RATE: 73 BPM | RESPIRATION RATE: 16 BRPM | OXYGEN SATURATION: 98 % | WEIGHT: 229.94 LBS | SYSTOLIC BLOOD PRESSURE: 136 MMHG

## 2024-09-10 VITALS
SYSTOLIC BLOOD PRESSURE: 127 MMHG | TEMPERATURE: 98 F | HEART RATE: 80 BPM | RESPIRATION RATE: 18 BRPM | OXYGEN SATURATION: 100 % | DIASTOLIC BLOOD PRESSURE: 80 MMHG

## 2024-09-10 DIAGNOSIS — Z90.710 ACQUIRED ABSENCE OF BOTH CERVIX AND UTERUS: Chronic | ICD-10-CM

## 2024-09-10 DIAGNOSIS — Z90.89 ACQUIRED ABSENCE OF OTHER ORGANS: Chronic | ICD-10-CM

## 2024-09-10 DIAGNOSIS — E89.6 POSTPROCEDURAL ADRENOCORTICAL (-MEDULLARY) HYPOFUNCTION: Chronic | ICD-10-CM

## 2024-09-10 LAB
ALBUMIN SERPL ELPH-MCNC: 3.6 G/DL — SIGNIFICANT CHANGE UP (ref 3.3–5)
ALP SERPL-CCNC: 73 U/L — SIGNIFICANT CHANGE UP (ref 40–120)
ALT FLD-CCNC: 15 U/L — SIGNIFICANT CHANGE UP (ref 10–45)
ANION GAP SERPL CALC-SCNC: 11 MMOL/L — SIGNIFICANT CHANGE UP (ref 5–17)
APPEARANCE UR: CLEAR — SIGNIFICANT CHANGE UP
AST SERPL-CCNC: 19 U/L — SIGNIFICANT CHANGE UP (ref 10–40)
BACTERIA # UR AUTO: NEGATIVE /HPF — SIGNIFICANT CHANGE UP
BASOPHILS # BLD AUTO: 0.05 K/UL — SIGNIFICANT CHANGE UP (ref 0–0.2)
BASOPHILS NFR BLD AUTO: 0.7 % — SIGNIFICANT CHANGE UP (ref 0–2)
BILIRUB SERPL-MCNC: 0.3 MG/DL — SIGNIFICANT CHANGE UP (ref 0.2–1.2)
BILIRUB UR-MCNC: NEGATIVE — SIGNIFICANT CHANGE UP
BUN SERPL-MCNC: 19 MG/DL — SIGNIFICANT CHANGE UP (ref 7–23)
CALCIUM SERPL-MCNC: 9.5 MG/DL — SIGNIFICANT CHANGE UP (ref 8.4–10.5)
CAST: 0 /LPF — SIGNIFICANT CHANGE UP (ref 0–4)
CHLORIDE SERPL-SCNC: 106 MMOL/L — SIGNIFICANT CHANGE UP (ref 96–108)
CO2 SERPL-SCNC: 24 MMOL/L — SIGNIFICANT CHANGE UP (ref 22–31)
COLOR SPEC: YELLOW — SIGNIFICANT CHANGE UP
CREAT SERPL-MCNC: 0.91 MG/DL — SIGNIFICANT CHANGE UP (ref 0.5–1.3)
DIFF PNL FLD: ABNORMAL
EGFR: 65 ML/MIN/1.73M2 — SIGNIFICANT CHANGE UP
EOSINOPHIL # BLD AUTO: 0.27 K/UL — SIGNIFICANT CHANGE UP (ref 0–0.5)
EOSINOPHIL NFR BLD AUTO: 4 % — SIGNIFICANT CHANGE UP (ref 0–6)
GLUCOSE SERPL-MCNC: 129 MG/DL — HIGH (ref 70–99)
GLUCOSE UR QL: NEGATIVE MG/DL — SIGNIFICANT CHANGE UP
HCT VFR BLD CALC: 39.5 % — SIGNIFICANT CHANGE UP (ref 34.5–45)
HGB BLD-MCNC: 12.3 G/DL — SIGNIFICANT CHANGE UP (ref 11.5–15.5)
IMM GRANULOCYTES NFR BLD AUTO: 0.6 % — SIGNIFICANT CHANGE UP (ref 0–0.9)
KETONES UR-MCNC: NEGATIVE MG/DL — SIGNIFICANT CHANGE UP
LEUKOCYTE ESTERASE UR-ACNC: NEGATIVE — SIGNIFICANT CHANGE UP
LYMPHOCYTES # BLD AUTO: 1.38 K/UL — SIGNIFICANT CHANGE UP (ref 1–3.3)
LYMPHOCYTES # BLD AUTO: 20.4 % — SIGNIFICANT CHANGE UP (ref 13–44)
MCHC RBC-ENTMCNC: 26.1 PG — LOW (ref 27–34)
MCHC RBC-ENTMCNC: 31.1 GM/DL — LOW (ref 32–36)
MCV RBC AUTO: 83.7 FL — SIGNIFICANT CHANGE UP (ref 80–100)
MONOCYTES # BLD AUTO: 0.55 K/UL — SIGNIFICANT CHANGE UP (ref 0–0.9)
MONOCYTES NFR BLD AUTO: 8.1 % — SIGNIFICANT CHANGE UP (ref 2–14)
NEUTROPHILS # BLD AUTO: 4.48 K/UL — SIGNIFICANT CHANGE UP (ref 1.8–7.4)
NEUTROPHILS NFR BLD AUTO: 66.2 % — SIGNIFICANT CHANGE UP (ref 43–77)
NITRITE UR-MCNC: NEGATIVE — SIGNIFICANT CHANGE UP
NRBC # BLD: 0 /100 WBCS — SIGNIFICANT CHANGE UP (ref 0–0)
PH UR: 7 — SIGNIFICANT CHANGE UP (ref 5–8)
PLATELET # BLD AUTO: 264 K/UL — SIGNIFICANT CHANGE UP (ref 150–400)
POTASSIUM SERPL-MCNC: 4.7 MMOL/L — SIGNIFICANT CHANGE UP (ref 3.5–5.3)
POTASSIUM SERPL-SCNC: 4.7 MMOL/L — SIGNIFICANT CHANGE UP (ref 3.5–5.3)
PROT SERPL-MCNC: 6.7 G/DL — SIGNIFICANT CHANGE UP (ref 6–8.3)
PROT UR-MCNC: NEGATIVE MG/DL — SIGNIFICANT CHANGE UP
RBC # BLD: 4.72 M/UL — SIGNIFICANT CHANGE UP (ref 3.8–5.2)
RBC # FLD: 15.1 % — HIGH (ref 10.3–14.5)
RBC CASTS # UR COMP ASSIST: 25 /HPF — HIGH (ref 0–4)
SODIUM SERPL-SCNC: 141 MMOL/L — SIGNIFICANT CHANGE UP (ref 135–145)
SP GR SPEC: 1.02 — SIGNIFICANT CHANGE UP (ref 1–1.03)
SQUAMOUS # UR AUTO: 2 /HPF — SIGNIFICANT CHANGE UP (ref 0–5)
UROBILINOGEN FLD QL: 1 MG/DL — SIGNIFICANT CHANGE UP (ref 0.2–1)
WBC # BLD: 6.77 K/UL — SIGNIFICANT CHANGE UP (ref 3.8–10.5)
WBC # FLD AUTO: 6.77 K/UL — SIGNIFICANT CHANGE UP (ref 3.8–10.5)
WBC UR QL: 1 /HPF — SIGNIFICANT CHANGE UP (ref 0–5)

## 2024-09-10 PROCEDURE — 87086 URINE CULTURE/COLONY COUNT: CPT

## 2024-09-10 PROCEDURE — 36415 COLL VENOUS BLD VENIPUNCTURE: CPT

## 2024-09-10 PROCEDURE — 85025 COMPLETE CBC W/AUTO DIFF WBC: CPT

## 2024-09-10 PROCEDURE — 80053 COMPREHEN METABOLIC PANEL: CPT

## 2024-09-10 PROCEDURE — 99284 EMERGENCY DEPT VISIT MOD MDM: CPT | Mod: FS

## 2024-09-10 PROCEDURE — 81001 URINALYSIS AUTO W/SCOPE: CPT

## 2024-09-10 PROCEDURE — 96374 THER/PROPH/DIAG INJ IV PUSH: CPT

## 2024-09-10 PROCEDURE — 74176 CT ABD & PELVIS W/O CONTRAST: CPT | Mod: 26,MC

## 2024-09-10 PROCEDURE — 74176 CT ABD & PELVIS W/O CONTRAST: CPT | Mod: MC

## 2024-09-10 PROCEDURE — 99284 EMERGENCY DEPT VISIT MOD MDM: CPT | Mod: 25

## 2024-09-10 RX ORDER — ACETAMINOPHEN 325 MG/1
1000 TABLET ORAL ONCE
Refills: 0 | Status: COMPLETED | OUTPATIENT
Start: 2024-09-10 | End: 2024-09-10

## 2024-09-10 RX ORDER — LIDOCAINE/BENZALKONIUM/ALCOHOL
1 SOLUTION, NON-ORAL TOPICAL ONCE
Refills: 0 | Status: COMPLETED | OUTPATIENT
Start: 2024-09-10 | End: 2024-09-10

## 2024-09-10 RX ORDER — IBUPROFEN 600 MG
600 TABLET ORAL ONCE
Refills: 0 | Status: COMPLETED | OUTPATIENT
Start: 2024-09-10 | End: 2024-09-10

## 2024-09-10 RX ADMIN — Medication 600 MILLIGRAM(S): at 11:35

## 2024-09-10 RX ADMIN — Medication 1 PATCH: at 11:35

## 2024-09-10 RX ADMIN — ACETAMINOPHEN 400 MILLIGRAM(S): 325 TABLET ORAL at 18:30

## 2024-09-10 RX ADMIN — Medication 1 PATCH: at 18:30

## 2024-09-10 NOTE — ED PROVIDER NOTE - OBJECTIVE STATEMENT
80 yo F with a PMH of kidney stones, HLD, sp hernia repair 03/2024 p/w right flank pain x yesterday. States sxs sharp and shooting and intermittent. Feels similar to her kidney stones in the past. However also endorses pain worse with movements and palpation of area. +urinary urgency/frequency. No other urinary complaints. Of note, pt had dysuria and burning with urination 2 weeks ago, was tx with abx for 7 days and those sxs resolved. No recent heavy lifting or phsyical exertion. No trauma. Denies nausea, vomiting, fever, chills, abd pain, diarrhea.

## 2024-09-10 NOTE — ED PROVIDER NOTE - PROGRESS NOTE DETAILS
UA with RBCs. Last stone required lithotripsy. Will get labs and CT. Olaf Rodriguez NP-C - CT compltete, awaiting rads read CT resulted left intrarenal nephrolith no acute right sided findings, results w/incidentals discussed with patient. pain is improved. ambulates independently. Will dc with follow up. Discussed plan and return precautions with patient who understands and agrees. All questions answered. - Rebel Mcgee PA-C

## 2024-09-10 NOTE — ED ADULT NURSE NOTE - OBJECTIVE STATEMENT
78 y/o female coming to the ER with c/o right flank pain. A&Ox4. Ambulatory. PMH HLD, HTN, kidney stone. Patient states yesterday she had sudden onset "sharp" right flank pain. Patient also endorses 3 days of urinary frequency. Abdomen is soft, non distended, non tender. Negative cva tenderness. Point tenderness over the right lower back. Patient denies chest pain, fever, chills, n/v/d, abdominal pain. Safety measures maintained. Bed in the lowest position. Provider at the bedside. No acute distress noted or further complaints at this time.

## 2024-09-10 NOTE — ED PROVIDER NOTE - PHYSICAL EXAMINATION
CONSTITUTIONAL: Well appearing and in no apparent distress.  ENT: Airway patent, moist mucous membranes.   EYES: Pupils equal, round and reactive to light. EOMI. Conjunctiva normal appearing.   CARDIAC: Normal rate, regular rhythm.  Heart sounds S1, S2.    RESPIRATORY: Breath sounds clear and equal bilaterally.   GASTROINTESTINAL: Abdomen soft, non-tender, not distended. No CVAT.   MUSCULOSKELETAL: Spine appears normal. +TTP over right paraspinal lumbar area. No lesions.   NEUROLOGICAL: Alert and oriented x3, non focal.
No

## 2024-09-10 NOTE — ED PROVIDER NOTE - NSFOLLOWUPINSTRUCTIONS_ED_ALL_ED_FT
Please follow up with your primary care doctor in 1-3 days.  *Bring all printed lab/test results to your appointment(s).*    Take acetaminophen 500-1000mg every 6 hrs as needed for pain. DO NOT EXCEED 4000mg DAILY.  Take ibuprofen 400-600mg every 6 hrs as needed for pain. Take with food  Lidocaine over the counter patches as needed   (Please read all medication information/instructions).    Return for worsening pain, difficulty walking, numbness, tingling, weakness, urinary/bowel retention/incontinence, fevers, vomiting, or any other concerns.

## 2024-09-10 NOTE — ED ADULT NURSE NOTE - CHIEF COMPLAINT QUOTE
- - - constant right side back pain since yesterday with nausea  Hx abdominal hernia surgery with mesh placement 3/24

## 2024-09-10 NOTE — ED PROVIDER NOTE - ATTENDING APP SHARED VISIT CONTRIBUTION OF CARE
79F pmh kidney stones HLD prior hernia repair here w R flank pain since yesterday. +Urgency and frequency. No hematuria. Recent tx for UTI 2 weeks ago. No f/c, NV, GI sx. No skin rashes. PE w/o abd ttp, no cvat. BL lumbar paraspinal TTP and pain with mvmt ie lying to sitting. NO wext weakness, numbness. NO skin rashes. MSK vs less likley UTI/pyelo, colic. Check UA, pain control. reeval. 79F pmh kidney stones HLD prior hernia repair here w R flank pain since yesterday. +Urgency and frequency. No hematuria. Recent tx for UTI 2 weeks ago. No f/c, NV, GI sx. No skin rashes. PE w/o abd ttp, slight R cvat and R lumbar paraspinal TTP. pain with mvmt ie lying to sitting. No ext weakness, numbness. NO skin rashes. MSK vs  UTI/pyelo vs colic. Check UA, pain control. reeval.

## 2024-09-10 NOTE — ED ADULT NURSE NOTE - NSFALLUNIVINTERV_ED_ALL_ED
Bed/Stretcher in lowest position, wheels locked, appropriate side rails in place/Call bell, personal items and telephone in reach/Instruct patient to call for assistance before getting out of bed/chair/stretcher/Non-slip footwear applied when patient is off stretcher/[place] to call system/Physically safe environment - no spills, clutter or unnecessary equipment/Purposeful proactive rounding/Room/bathroom lighting operational, light cord in reach

## 2024-09-10 NOTE — ED PROVIDER NOTE - PATIENT PORTAL LINK FT
You can access the FollowMyHealth Patient Portal offered by [facility] by registering at the following website: [email or web address]. By joining [employer]’s FollowMyHealth portal, you will also be able to view your health information using other applications (apps) compatible with our system.

## 2024-09-10 NOTE — ED ADULT TRIAGE NOTE - CHIEF COMPLAINT QUOTE
constant right side back pain since yesterday with nausea  Hx abdominal hernia surgery with mesh placement 3/24

## 2024-09-11 LAB
CULTURE RESULTS: SIGNIFICANT CHANGE UP
SPECIMEN SOURCE: SIGNIFICANT CHANGE UP

## 2024-10-02 ENCOUNTER — APPOINTMENT (OUTPATIENT)
Dept: UROGYNECOLOGY | Facility: CLINIC | Age: 79
End: 2024-10-02
Payer: MEDICARE

## 2024-10-02 VITALS
SYSTOLIC BLOOD PRESSURE: 142 MMHG | BODY MASS INDEX: 38.32 KG/M2 | HEIGHT: 65 IN | DIASTOLIC BLOOD PRESSURE: 79 MMHG | HEART RATE: 74 BPM | WEIGHT: 230 LBS

## 2024-10-02 DIAGNOSIS — Z87.891 PERSONAL HISTORY OF NICOTINE DEPENDENCE: ICD-10-CM

## 2024-10-02 DIAGNOSIS — N39.41 URGE INCONTINENCE: ICD-10-CM

## 2024-10-02 DIAGNOSIS — N81.2 INCOMPLETE UTEROVAGINAL PROLAPSE: ICD-10-CM

## 2024-10-02 DIAGNOSIS — Z86.69 PERSONAL HISTORY OF OTHER DISEASES OF THE NERVOUS SYSTEM AND SENSE ORGANS: ICD-10-CM

## 2024-10-02 DIAGNOSIS — Z86.39 PERSONAL HISTORY OF OTHER ENDOCRINE, NUTRITIONAL AND METABOLIC DISEASE: ICD-10-CM

## 2024-10-02 DIAGNOSIS — N36.41 HYPERMOBILITY OF URETHRA: ICD-10-CM

## 2024-10-02 DIAGNOSIS — Z86.79 PERSONAL HISTORY OF OTHER DISEASES OF THE CIRCULATORY SYSTEM: ICD-10-CM

## 2024-10-02 DIAGNOSIS — N81.11 CYSTOCELE, MIDLINE: ICD-10-CM

## 2024-10-02 DIAGNOSIS — Z78.9 OTHER SPECIFIED HEALTH STATUS: ICD-10-CM

## 2024-10-02 DIAGNOSIS — Z86.73 PERSONAL HISTORY OF TRANSIENT ISCHEMIC ATTACK (TIA), AND CEREBRAL INFARCTION W/OUT RESIDUAL DEFICITS: ICD-10-CM

## 2024-10-02 DIAGNOSIS — N99.3 PROLAPSE OF VAGINAL VAULT AFTER HYSTERECTOMY: ICD-10-CM

## 2024-10-02 DIAGNOSIS — K57.90 DIVERTICULOSIS OF INTESTINE, PART UNSPECIFIED, W/OUT PERFORATION OR ABSCESS W/OUT BLEEDING: ICD-10-CM

## 2024-10-02 LAB
APPEARANCE: CLEAR
BACTERIA: NEGATIVE /HPF
BILIRUB UR QL STRIP: NORMAL
BILIRUBIN URINE: NEGATIVE
BLOOD URINE: NEGATIVE
CAST: 0 /LPF
CLARITY UR: CLEAR
COLLECTION METHOD: NORMAL
COLOR: NORMAL
EPITHELIAL CELLS: 2 /HPF
GLUCOSE QUALITATIVE U: NEGATIVE MG/DL
GLUCOSE UR-MCNC: NORMAL
HCG UR QL: 0.2 EU/DL
HGB UR QL STRIP.AUTO: NORMAL
KETONES UR-MCNC: NORMAL
KETONES URINE: NEGATIVE MG/DL
LEUKOCYTE ESTERASE UR QL STRIP: NORMAL
LEUKOCYTE ESTERASE URINE: ABNORMAL
MICROSCOPIC-UA: NORMAL
NITRITE UR QL STRIP: NORMAL
NITRITE URINE: NEGATIVE
PH UR STRIP: 5.5
PH URINE: 6
PROT UR STRIP-MCNC: NORMAL
PROTEIN URINE: 30 MG/DL
RED BLOOD CELLS URINE: 3 /HPF
SP GR UR STRIP: 1.02
SPECIFIC GRAVITY URINE: 1.02
UROBILINOGEN URINE: 0.2 MG/DL
WHITE BLOOD CELLS URINE: 0 /HPF

## 2024-10-02 PROCEDURE — 99459 PELVIC EXAMINATION: CPT

## 2024-10-02 PROCEDURE — 81003 URINALYSIS AUTO W/O SCOPE: CPT | Mod: QW

## 2024-10-02 PROCEDURE — 51701 INSERT BLADDER CATHETER: CPT

## 2024-10-02 PROCEDURE — 99203 OFFICE O/P NEW LOW 30 MIN: CPT | Mod: 25

## 2024-10-02 RX ORDER — ROSUVASTATIN CALCIUM 10 MG/1
10 TABLET, FILM COATED ORAL
Refills: 0 | Status: ACTIVE | COMMUNITY

## 2024-10-02 RX ORDER — AMLODIPINE BESYLATE 5 MG/1
TABLET ORAL
Refills: 0 | Status: ACTIVE | COMMUNITY

## 2024-10-02 RX ORDER — CHOLECALCIFEROL (VITAMIN D3) 25 MCG
2500 TABLET,CHEWABLE ORAL
Refills: 0 | Status: ACTIVE | COMMUNITY

## 2024-10-02 RX ORDER — KRILL/OM-3/DHA/EPA/PHOSPHO/AST 1000-230MG
81 CAPSULE ORAL
Refills: 0 | Status: ACTIVE | COMMUNITY

## 2024-10-05 LAB — BACTERIA UR CULT: NORMAL

## 2024-10-05 NOTE — PHYSICAL EXAM
[Chaperone Present] : A chaperone was present in the examining room during all aspects of the physical examination [[id number]] : A chaperone was present during the pelvic exam. [Well developed] : well developed [Well Nourished] : ~L well nourished [Good Hygeine] : demonstrates good hygeine [Normal Mood/Affect] : mood and affect are normal [Obese] : obese [Warm and Dry] : was warm and dry to touch [Normal Gait] : gait was normal [Normal Appearance] : general appearance was normal [Cystocele] : a cystocele [Aa ____] : Aa [unfilled] [Ba ____] : Ba [unfilled] [C ____] : C [unfilled] [GH ____] : GH [unfilled] [PB ____] : PB [unfilled] [TVL ____] : TVL  [unfilled] [Ap ____] : Ap [unfilled] [Bp ____] : Bp [unfilled] [Normal] : normal [Absent] : absent [Post Void Residual ____ml] : post void residual was [unfilled] ml [FreeTextEntry2] : Melissa [Anxiety] : patient is not anxious [Tenderness] : ~T no ~M abdominal tenderness observed [Distended] : not distended [FreeTextEntry3] : + hypermobility [FreeTextEntry4] : cervical/vaginal vault

## 2024-10-05 NOTE — PHYSICAL EXAM
[Chaperone Present] : A chaperone was present in the examining room during all aspects of the physical examination [84707] : A chaperone was present during the pelvic exam. [Well developed] : well developed [Well Nourished] : ~L well nourished [Good Hygeine] : demonstrates good hygeine [Normal Mood/Affect] : mood and affect are normal [Obese] : obese [Warm and Dry] : was warm and dry to touch [Normal Gait] : gait was normal [Normal Appearance] : general appearance was normal [Cystocele] : a cystocele [Aa ____] : Aa [unfilled] [Ba ____] : Ba [unfilled] [C ____] : C [unfilled] [GH ____] : GH [unfilled] [PB ____] : PB [unfilled] [TVL ____] : TVL  [unfilled] [Ap ____] : Ap [unfilled] [Bp ____] : Bp [unfilled] [Normal] : normal [Absent] : absent [Post Void Residual ____ml] : post void residual was [unfilled] ml [FreeTextEntry2] : Melissa [Anxiety] : patient is not anxious [Tenderness] : ~T no ~M abdominal tenderness observed [Distended] : not distended [FreeTextEntry3] : + hypermobility [FreeTextEntry4] : cervical/vaginal vault

## 2024-10-05 NOTE — HISTORY OF PRESENT ILLNESS
[Vaginal Wall Prolapse] : no [] : years ago [Unable To Restrain Bowel Movement] : no [Feelings Of Urinary Urgency] : no [Urinary Tract Infection] : no [x1] : nocturia once nightly [FreeTextEntry5] : daily - getting worse over time [FreeTextEntry3] : sometimes [de-identified] : sometimes [de-identified] : not sexually active [FreeTextEntry1] : Positive umbilical hernia with mesh. Patient has tried a pessary several years ago

## 2024-10-05 NOTE — DISCUSSION/SUMMARY
[FreeTextEntry1] : I reviewed the above findings with the patient with visual illustrations. Treatment options for the prolapse were discussed and included doing nothing, Kegel exercises and behavioral modification, a pessary, or surgical correction. Surgically we discussed the abdominal vs the vaginal routes. Abdominally we discussed a sacral colpopexy laparoscopically and robotically.  Vaginally we discussed a sacrospinous suspension versus vaginal closure.  Risks and benefits of the treatment options were discussed.  At this point in time she does not desire treatment for the prolapse.  We discussed that urine dipstick revealed moderate blood and trace leukocytes she reports being treated for a UTI approximately 2 weeks ago we discussed sending off for urinalysis with microscopy and culture.  All questions were answered.  IUG a patient information on pelvic organ prolapse and overactive bladder was given to her.

## 2024-10-05 NOTE — HISTORY OF PRESENT ILLNESS
[Vaginal Wall Prolapse] : no [] : years ago [Unable To Restrain Bowel Movement] : no [Feelings Of Urinary Urgency] : no [Urinary Tract Infection] : no [x1] : nocturia once nightly [FreeTextEntry3] : sometimes [FreeTextEntry5] : daily - getting worse over time [de-identified] : sometimes [de-identified] : not sexually active [FreeTextEntry1] : Positive umbilical hernia with mesh. Patient has tried a pessary several years ago

## 2024-10-18 ENCOUNTER — APPOINTMENT (OUTPATIENT)
Dept: CT IMAGING | Facility: IMAGING CENTER | Age: 79
End: 2024-10-18
Payer: MEDICARE

## 2024-10-18 ENCOUNTER — OUTPATIENT (OUTPATIENT)
Dept: OUTPATIENT SERVICES | Facility: HOSPITAL | Age: 79
LOS: 1 days | End: 2024-10-18
Payer: MEDICARE

## 2024-10-18 DIAGNOSIS — Z90.710 ACQUIRED ABSENCE OF BOTH CERVIX AND UTERUS: Chronic | ICD-10-CM

## 2024-10-18 DIAGNOSIS — E89.6 POSTPROCEDURAL ADRENOCORTICAL (-MEDULLARY) HYPOFUNCTION: Chronic | ICD-10-CM

## 2024-10-18 DIAGNOSIS — Z90.89 ACQUIRED ABSENCE OF OTHER ORGANS: Chronic | ICD-10-CM

## 2024-10-18 DIAGNOSIS — N39.41 URGE INCONTINENCE: ICD-10-CM

## 2024-10-18 PROCEDURE — 74178 CT ABD&PLV WO CNTR FLWD CNTR: CPT | Mod: 26

## 2024-10-18 PROCEDURE — 74178 CT ABD&PLV WO CNTR FLWD CNTR: CPT

## 2024-11-04 ENCOUNTER — APPOINTMENT (OUTPATIENT)
Dept: UROGYNECOLOGY | Facility: CLINIC | Age: 79
End: 2024-11-04

## 2024-11-04 VITALS
SYSTOLIC BLOOD PRESSURE: 138 MMHG | BODY MASS INDEX: 38.32 KG/M2 | HEART RATE: 78 BPM | HEIGHT: 65 IN | DIASTOLIC BLOOD PRESSURE: 76 MMHG | WEIGHT: 230 LBS

## 2024-11-04 LAB
BILIRUB UR QL STRIP: NORMAL
CLARITY UR: CLEAR
COLLECTION METHOD: NORMAL
GLUCOSE UR-MCNC: NORMAL
HCG UR QL: 0.2 EU/DL
HGB UR QL STRIP.AUTO: NORMAL
KETONES UR-MCNC: NORMAL
LEUKOCYTE ESTERASE UR QL STRIP: NORMAL
NITRITE UR QL STRIP: NORMAL
PH UR STRIP: 5.5
PROT UR STRIP-MCNC: NORMAL
SP GR UR STRIP: 1.02

## 2024-12-23 ENCOUNTER — APPOINTMENT (OUTPATIENT)
Dept: UROLOGY | Facility: CLINIC | Age: 79
End: 2024-12-23
Payer: MEDICARE

## 2024-12-23 VITALS
OXYGEN SATURATION: 98 % | RESPIRATION RATE: 16 BRPM | WEIGHT: 230 LBS | BODY MASS INDEX: 38.32 KG/M2 | TEMPERATURE: 98 F | HEART RATE: 81 BPM | HEIGHT: 65 IN | DIASTOLIC BLOOD PRESSURE: 81 MMHG | SYSTOLIC BLOOD PRESSURE: 129 MMHG

## 2024-12-23 DIAGNOSIS — N28.1 CYST OF KIDNEY, ACQUIRED: ICD-10-CM

## 2024-12-23 DIAGNOSIS — N20.0 CALCULUS OF KIDNEY: ICD-10-CM

## 2024-12-23 PROCEDURE — 99204 OFFICE O/P NEW MOD 45 MIN: CPT

## 2024-12-23 PROCEDURE — G2211 COMPLEX E/M VISIT ADD ON: CPT

## 2025-02-26 ENCOUNTER — EMERGENCY (EMERGENCY)
Facility: HOSPITAL | Age: 80
LOS: 1 days | Discharge: HOME | End: 2025-02-26
Attending: EMERGENCY MEDICINE
Payer: MEDICARE

## 2025-02-26 VITALS
WEIGHT: 233.91 LBS | SYSTOLIC BLOOD PRESSURE: 158 MMHG | HEART RATE: 91 BPM | OXYGEN SATURATION: 98 % | RESPIRATION RATE: 16 BRPM | DIASTOLIC BLOOD PRESSURE: 81 MMHG | TEMPERATURE: 98 F | HEIGHT: 65 IN

## 2025-02-26 VITALS
RESPIRATION RATE: 16 BRPM | TEMPERATURE: 98 F | OXYGEN SATURATION: 97 % | DIASTOLIC BLOOD PRESSURE: 82 MMHG | SYSTOLIC BLOOD PRESSURE: 131 MMHG | HEART RATE: 72 BPM

## 2025-02-26 DIAGNOSIS — E89.6 POSTPROCEDURAL ADRENOCORTICAL (-MEDULLARY) HYPOFUNCTION: Chronic | ICD-10-CM

## 2025-02-26 DIAGNOSIS — Z90.89 ACQUIRED ABSENCE OF OTHER ORGANS: Chronic | ICD-10-CM

## 2025-02-26 DIAGNOSIS — Z90.710 ACQUIRED ABSENCE OF BOTH CERVIX AND UTERUS: Chronic | ICD-10-CM

## 2025-02-26 LAB
ALBUMIN SERPL ELPH-MCNC: 3.9 G/DL — SIGNIFICANT CHANGE UP (ref 3.3–5)
ALP SERPL-CCNC: 80 U/L — SIGNIFICANT CHANGE UP (ref 40–120)
ALT FLD-CCNC: 13 U/L — SIGNIFICANT CHANGE UP (ref 10–45)
ANION GAP SERPL CALC-SCNC: 12 MMOL/L — SIGNIFICANT CHANGE UP (ref 5–17)
APPEARANCE UR: CLEAR — SIGNIFICANT CHANGE UP
AST SERPL-CCNC: 18 U/L — SIGNIFICANT CHANGE UP (ref 10–40)
BACTERIA # UR AUTO: NEGATIVE /HPF — SIGNIFICANT CHANGE UP
BASOPHILS # BLD AUTO: 0.04 K/UL — SIGNIFICANT CHANGE UP (ref 0–0.2)
BASOPHILS NFR BLD AUTO: 0.7 % — SIGNIFICANT CHANGE UP (ref 0–2)
BILIRUB SERPL-MCNC: 0.3 MG/DL — SIGNIFICANT CHANGE UP (ref 0.2–1.2)
BILIRUB UR-MCNC: NEGATIVE — SIGNIFICANT CHANGE UP
BUN SERPL-MCNC: 16 MG/DL — SIGNIFICANT CHANGE UP (ref 7–23)
CALCIUM SERPL-MCNC: 9.9 MG/DL — SIGNIFICANT CHANGE UP (ref 8.4–10.5)
CAST: 1 /LPF — SIGNIFICANT CHANGE UP (ref 0–4)
CHLORIDE SERPL-SCNC: 105 MMOL/L — SIGNIFICANT CHANGE UP (ref 96–108)
CO2 SERPL-SCNC: 25 MMOL/L — SIGNIFICANT CHANGE UP (ref 22–31)
COLOR SPEC: YELLOW — SIGNIFICANT CHANGE UP
CREAT SERPL-MCNC: 0.89 MG/DL — SIGNIFICANT CHANGE UP (ref 0.5–1.3)
DIFF PNL FLD: NEGATIVE — SIGNIFICANT CHANGE UP
EGFR: 66 ML/MIN/1.73M2 — SIGNIFICANT CHANGE UP
EOSINOPHIL # BLD AUTO: 0.32 K/UL — SIGNIFICANT CHANGE UP (ref 0–0.5)
EOSINOPHIL NFR BLD AUTO: 5.3 % — SIGNIFICANT CHANGE UP (ref 0–6)
GAS PNL BLDV: SIGNIFICANT CHANGE UP
GLUCOSE SERPL-MCNC: 124 MG/DL — HIGH (ref 70–99)
GLUCOSE UR QL: NEGATIVE MG/DL — SIGNIFICANT CHANGE UP
HCT VFR BLD CALC: 39.4 % — SIGNIFICANT CHANGE UP (ref 34.5–45)
HGB BLD-MCNC: 12.6 G/DL — SIGNIFICANT CHANGE UP (ref 11.5–15.5)
IMM GRANULOCYTES NFR BLD AUTO: 0.8 % — SIGNIFICANT CHANGE UP (ref 0–0.9)
KETONES UR-MCNC: NEGATIVE MG/DL — SIGNIFICANT CHANGE UP
LEUKOCYTE ESTERASE UR-ACNC: NEGATIVE — SIGNIFICANT CHANGE UP
LIDOCAIN IGE QN: 17 U/L — SIGNIFICANT CHANGE UP (ref 7–60)
LYMPHOCYTES # BLD AUTO: 1.04 K/UL — SIGNIFICANT CHANGE UP (ref 1–3.3)
LYMPHOCYTES # BLD AUTO: 17.1 % — SIGNIFICANT CHANGE UP (ref 13–44)
MCHC RBC-ENTMCNC: 26.8 PG — LOW (ref 27–34)
MCHC RBC-ENTMCNC: 32 G/DL — SIGNIFICANT CHANGE UP (ref 32–36)
MCV RBC AUTO: 83.7 FL — SIGNIFICANT CHANGE UP (ref 80–100)
MONOCYTES # BLD AUTO: 0.61 K/UL — SIGNIFICANT CHANGE UP (ref 0–0.9)
MONOCYTES NFR BLD AUTO: 10 % — SIGNIFICANT CHANGE UP (ref 2–14)
NEUTROPHILS # BLD AUTO: 4.02 K/UL — SIGNIFICANT CHANGE UP (ref 1.8–7.4)
NEUTROPHILS NFR BLD AUTO: 66.1 % — SIGNIFICANT CHANGE UP (ref 43–77)
NITRITE UR-MCNC: NEGATIVE — SIGNIFICANT CHANGE UP
NRBC BLD AUTO-RTO: 0 /100 WBCS — SIGNIFICANT CHANGE UP (ref 0–0)
PH UR: 5.5 — SIGNIFICANT CHANGE UP (ref 5–8)
PLATELET # BLD AUTO: 293 K/UL — SIGNIFICANT CHANGE UP (ref 150–400)
POTASSIUM SERPL-MCNC: 4.4 MMOL/L — SIGNIFICANT CHANGE UP (ref 3.5–5.3)
POTASSIUM SERPL-SCNC: 4.4 MMOL/L — SIGNIFICANT CHANGE UP (ref 3.5–5.3)
PROT SERPL-MCNC: 7.1 G/DL — SIGNIFICANT CHANGE UP (ref 6–8.3)
PROT UR-MCNC: NEGATIVE MG/DL — SIGNIFICANT CHANGE UP
RBC # BLD: 4.71 M/UL — SIGNIFICANT CHANGE UP (ref 3.8–5.2)
RBC # FLD: 14.7 % — HIGH (ref 10.3–14.5)
RBC CASTS # UR COMP ASSIST: 1 /HPF — SIGNIFICANT CHANGE UP (ref 0–4)
SODIUM SERPL-SCNC: 142 MMOL/L — SIGNIFICANT CHANGE UP (ref 135–145)
SP GR SPEC: 1.02 — SIGNIFICANT CHANGE UP (ref 1–1.03)
SQUAMOUS # UR AUTO: 0 /HPF — SIGNIFICANT CHANGE UP (ref 0–5)
UROBILINOGEN FLD QL: 0.2 MG/DL — SIGNIFICANT CHANGE UP (ref 0.2–1)
WBC # BLD: 6.08 K/UL — SIGNIFICANT CHANGE UP (ref 3.8–10.5)
WBC # FLD AUTO: 6.08 K/UL — SIGNIFICANT CHANGE UP (ref 3.8–10.5)
WBC UR QL: 0 /HPF — SIGNIFICANT CHANGE UP (ref 0–5)

## 2025-02-26 PROCEDURE — 87086 URINE CULTURE/COLONY COUNT: CPT

## 2025-02-26 PROCEDURE — 99285 EMERGENCY DEPT VISIT HI MDM: CPT

## 2025-02-26 PROCEDURE — 85025 COMPLETE CBC W/AUTO DIFF WBC: CPT

## 2025-02-26 PROCEDURE — 84132 ASSAY OF SERUM POTASSIUM: CPT

## 2025-02-26 PROCEDURE — 82803 BLOOD GASES ANY COMBINATION: CPT

## 2025-02-26 PROCEDURE — 80053 COMPREHEN METABOLIC PANEL: CPT

## 2025-02-26 PROCEDURE — 85014 HEMATOCRIT: CPT

## 2025-02-26 PROCEDURE — 83605 ASSAY OF LACTIC ACID: CPT

## 2025-02-26 PROCEDURE — 74177 CT ABD & PELVIS W/CONTRAST: CPT | Mod: 26

## 2025-02-26 PROCEDURE — 81001 URINALYSIS AUTO W/SCOPE: CPT

## 2025-02-26 PROCEDURE — 74177 CT ABD & PELVIS W/CONTRAST: CPT | Mod: MC

## 2025-02-26 PROCEDURE — 82435 ASSAY OF BLOOD CHLORIDE: CPT

## 2025-02-26 PROCEDURE — 99284 EMERGENCY DEPT VISIT MOD MDM: CPT | Mod: 25

## 2025-02-26 PROCEDURE — 85018 HEMOGLOBIN: CPT

## 2025-02-26 PROCEDURE — 82330 ASSAY OF CALCIUM: CPT

## 2025-02-26 PROCEDURE — 83690 ASSAY OF LIPASE: CPT

## 2025-02-26 PROCEDURE — 82947 ASSAY GLUCOSE BLOOD QUANT: CPT

## 2025-02-26 PROCEDURE — 36415 COLL VENOUS BLD VENIPUNCTURE: CPT

## 2025-02-26 PROCEDURE — 84295 ASSAY OF SERUM SODIUM: CPT

## 2025-02-26 PROCEDURE — 96374 THER/PROPH/DIAG INJ IV PUSH: CPT | Mod: XU

## 2025-02-26 RX ORDER — ACETAMINOPHEN 160 MG/5ML
1000 SUSPENSION ORAL ONCE
Refills: 0 | Status: COMPLETED | OUTPATIENT
Start: 2025-02-26 | End: 2025-02-26

## 2025-02-26 RX ORDER — BACTERIOSTATIC SODIUM CHLORIDE 0.9 %
1000 VIAL (ML) INJECTION ONCE
Refills: 0 | Status: COMPLETED | OUTPATIENT
Start: 2025-02-26 | End: 2025-02-26

## 2025-02-26 RX ADMIN — ACETAMINOPHEN 400 MILLIGRAM(S): 160 SUSPENSION ORAL at 12:34

## 2025-02-26 RX ADMIN — Medication 1000 MILLILITER(S): at 12:33

## 2025-02-26 NOTE — ED PROVIDER NOTE - ATTENDING APP SHARED VISIT CONTRIBUTION OF CARE
Patient is a 79-year-old female with a history of type 2 diabetes mellitus, hypertension, high cholesterol, history of CVA, nephrolithiasis, surgical history significant for partial hysterectomy, partial adrenalectomy, tonsillectomy here for evaluation of left flank pain x 3 days.  Patient reports pain is sharp and constant.  Denies any heavy lifting or trauma.  Pain is worse with movement.  Pain does not radiate.  No nausea, vomiting or diarrhea.  No urinary symptoms.  Denies dysuria.  Patient reports that she had lithotripsy a long time ago for kidney stone.  She was seen in the emergency department in September 2024 and had a CT with the following finding: Nonobstructing calculus in the left renal pelvis, 0.4 cm. No   hydronephrosis. No urothelial enhancing lesion.     VS noted  Gen. no acute distress, Non toxic   HEENT: EOMI, mmm  Lungs: CTAB/L no C/ W /R   CVS: RRR   Abd; Soft non tender, non distended, no CVA tenderness bilaterally  Ext: no edema  Skin: no rash  Neuro AAOx3 non focal clear speech  a/p:  left flank tenderness–pain is not reproducible.  Patient reports the pain is on the inside.  Given history of nephrolithiasis, will get labs, CT abdomen pelvis.  Will give IV Tylenol.  Will send urine to rule out UTI.  Differential includes musculoskeletal pain, nephrolithiasis.  Patient has no infectious symptoms to suggest other etiology.  - Nelly SALAS

## 2025-02-26 NOTE — ED PROVIDER NOTE - IV ALTEPLASE INCLUSION HIDDEN
Case Management Discharge Planning    Admission Date: 4/10/2023  GMLOS:    ALOS: 1    TRANSFER BACK PATIENT    Referring Facility: [facility]  Reason for Transfer: GI Bleed, surgical consult    Signed Repatriation/Transfer Back Agreement saved in .    Once this patient has received the requested service or the patient no longer requires tertiary level of care from [facility] and is stable to transfer back to referring facility, we can facilitate a transfer back. Please contact the [facility] at [phone] to coordinate this transfer request.       show

## 2025-02-26 NOTE — ED ADULT TRIAGE NOTE - CHIEF COMPLAINT QUOTE
left flank pain, saw nephrologist a few weeks ago who took a "scan" that showed a left kidney stone, started getting more painful about 3 days ago, painful urination, urine darker than normal. denies radiation/N/V

## 2025-02-26 NOTE — ED PROVIDER NOTE - NSFOLLOWUPINSTRUCTIONS_ED_ALL_ED_FT
Please make sure to follow up with your primary care doctor within 1-2 days and with the UROLOGY specialist. The information for follow up can be found below. Bring a copy of all of your results with you to your follow up appointments.   Return to the ER as discussed if you develop any new or worsening symptoms.     Follow up with your primary care doctor regarding the incidentals we discussed.    You may take Tylenol 1000mg every 6 hours as needed for pain and/or Ibuprofen 400mg every 6-8 hours as needed for pain. Take Ibuprofen with food. Lidocaine patches as directed.      [facility]  Located in: [facility]  Address: [address] [place] [address]  Phone: [phone]

## 2025-02-26 NOTE — ED ADULT NURSE NOTE - OBJECTIVE STATEMENT
Pt came in c/o left flank pain for a few days. Pt has a hx: kidney stone. Pt is alert and orientedX4. Pt is able to ambulate with assist. Pt's  at bedside. Pt observed to be comfortable. calm and cooperative at this time.

## 2025-02-26 NOTE — ED PROVIDER NOTE - PATIENT PORTAL LINK FT
You can access the FollowMyHealth Patient Portal offered by [facility] by registering at the following website: [email or web address]. By joining [employer]’s FollowMyHealth portal, you will also be able to view your health information using other applications (apps) compatible with our system.

## 2025-02-26 NOTE — ED PROVIDER NOTE - PROGRESS NOTE DETAILS
Pt reassessed, reports pain improved. Tolerated PO. Advised Tylenol/Motrin for pain control. Lidocaine patches PRN. Inicdental findings dw pt including lung opacities, liver cysts, fatty pancreas, anterolisthesis of L4 and L5 as well as L3 unchanged lesion. To f/u with PMD and Urology. Copy of results provided. Patient stable for discharge.  Follow up instructions given, return to ED precautions reviewed. Importance of follow up emphasized, patient verbalized understanding.  All questions answered. Myriam Condon PA-C Attending MD Simon: Patient re-evaluated and feeling improved.  No acute issues at  this time.  Lab and radiology tests reviewed with patient and family.  Reviewed incidental findings including liver, pulmonary, renal, spine findings.  Patient stable for discharge. Follow up instructions given, importance of follow up emphasized, return to ED parameters reviewed and patient verbalized understanding.  All questions answered, all concerns addressed.

## 2025-02-26 NOTE — ED ADULT TRIAGE NOTE - PAIN RATING/NUMBER SCALE (0-10): ACTIVITY
Continue adderall xr 20 mg. Tolerating medication well.  Continue with 3m follow ups for controlled substance.  Controlled substance agreement up-to-date.  Recently completed LPN.     Orders:    amphetamine-dextroamphetamine (ADDERALL XR, 20MG,) 20 MG 24 hr capsule; Take 1 capsule (20 mg total) by mouth every morning Max Daily Amount: 20 mg        
10 (severe pain)

## 2025-02-26 NOTE — ED PROVIDER NOTE - PHYSICAL EXAMINATION
CONSTITUTIONAL: Well appearing and in no apparent distress.  ENT: Airway patent, moist mucous membranes.   EYES: Pupils equal, round and reactive to light. EOMI. Conjunctiva normal appearing.   CARDIAC: Normal rate, regular rhythm.  Heart sounds S1, S2.    RESPIRATORY: Breath sounds clear and equal bilaterally.   GASTROINTESTINAL: Abdomen soft, non-tender, not distended.  MUSCULOSKELETAL: Spine appears normal.  NEUROLOGICAL: Alert and oriented x3, no focal deficits, no motor or sensory deficits. 5/5 muscle strength throughout.  SKIN: Skin normal color, warm, dry and intact.   PSYCHIATRIC: Normal mood and affect. CONSTITUTIONAL: Well appearing and in no apparent distress.  ENT: Airway patent, moist mucous membranes.   EYES: Pupils equal, round and reactive to light. EOMI. Conjunctiva normal appearing.   CARDIAC: Normal rate, regular rhythm.  Heart sounds S1, S2.    RESPIRATORY: Breath sounds clear and equal bilaterally.   GASTROINTESTINAL: Abdomen soft, non-tender, not distended. No CVAT bilaterally.  MUSCULOSKELETAL: Spine appears normal.  NEUROLOGICAL: Alert and oriented x3, no focal deficits.  SKIN: Skin normal color, warm, dry and intact. No lesions.

## 2025-02-26 NOTE — ED PROVIDER NOTE - SHIFT CHANGE DETAILS
Attending MD Simon: 79F w L flank pain, hx of nephrolithiasis, 3d of pain, DDx MSK vs stone again, pending CTr

## 2025-02-26 NOTE — ED PROVIDER NOTE - OBJECTIVE STATEMENT
80 yo F with a PMH of kidney stones, HLD, sp hernia repair 03/2024 p/w right flank pain x      Of note, pt was also seen in ED for right flank pain in 09/2024. Had CT AP which revealed non obstructing calculi in L renal pelvis. 78 yo F with a PMH of kidney stones, HLD, sp hernia repair 03/2024 p/w right flank pain x several days. States pain worse with certain movements. Denies any trauma or heavy lifting. Described as constant and sharp and similar to prior kidney stones. Denies any urinary complaints. Took Alleve x 2 days ago w/o relief and has not taken anything since. Is tolerating PO. Denies nausea, vomiting, fever, chills, chest pain, SOB. Pt states she recently saw her  doctor at [facility] had imaging but cannot recall US vs CT. Was told again that she had L intrarenal stones and that they would be watched. At that time she had no L flank pain.   Of note, pt was also seen in ED for right flank pain in 09/2024. Had CT AP which revealed non obstructing calculi in L renal pelvis.

## 2025-02-27 LAB
CULTURE RESULTS: SIGNIFICANT CHANGE UP
SPECIMEN SOURCE: SIGNIFICANT CHANGE UP

## 2025-04-02 NOTE — ED PROVIDER NOTE - ATTENDING CONTRIBUTION TO CARE
Assessment  Hypothyroidism due to Hashimoto's/chronic auto-Immune  Appearing clinically euthyroid and biochemically hypothyroid  Currently on levothyroxine 137 mcg 8 pills a week     Last thyroid function tests:   Lab Results   Component Value Date    TSH 30.450 (H) 02/04/2025    T4FREE 1.0 02/04/2025       Plan  Update dose of LT4 per prescription details  and re-evaluation clinically and biochemically and TFTs as ordered  Plan to increase 137mcg pills to 9 pills a week repeat TFTs in 8-12 weeks       Counseling  Counseled on proper administration of levothyroxine/thyroid medications ideally 30+ min before other medications, diet - reemphasized this point   Counseled on adherence to medications, and they may double their dose next day if missed one day        Relevant Physical Exam     Appearing well, not in active distress  No thyromegaly, thyroid nodule, cervical lymphadenopathy  No tenderness on neck exam  Extraocular muscles intact, no proptosis  No tremor on outstretched hands  DTR 1+ in upper extremity bilaterally  Regular rate and rhythm  No edema, myxedema noted  No clubbing noted         77 y/o f with pmh HTN HLD DM type 2, presents for left flank pain. here with  at the bedside. pain began on THursday and worsening today. changes with movement. no fever or chills. no vomiting or diarrhea. no changes in urination. no hematuria. had this once in the past many years ago and was never diagnosed. no weakness or numbness. no incontinence no saddle anesthesia. min relief with alleve at home. no skin changes.   Gen. no acute resp distress   HEENT:  perrl eomi  Lungs:  b/l bs no crackles / wheezing or rhonchi.  back no skin changes. no midline tend no step off.   CVS: S1S2   Abd;  soft no guarding no distention. no ttp  Ext: no pitting edema or erythema  Neuro: aaox3 no focal deficits  MSK: strength 5/5 b/l  upper and lower ext.

## 2025-04-04 NOTE — ED ADULT TRIAGE NOTE - ADDITIONAL SAFETY/BANDS...
Medication: Oxycodone  Last office visit date: 2/27/25  Medication Refill Protocol Failed.    Opioid Refill Protocol - 12 Month Protocol - ALWAYS forward to ordering provider [email or web address]/04/2025 11:26 AM   Protocol Details FORWARD TO PROVIDER - Refill requests for these medications must ALWAYS be forwarded to the ordering provider, even if all criteria are met    PDMP has been reviewed in last 24 hours    Urine/serum drug screen on file in the appropriate time frame according to Opioid Risk Tool (ORT) score    Active/up-to-date opioid agreement/consent on file    Naloxone on current med list and has other factors (see details for factors)      Additional Safety/Bands:

## 2025-05-12 ENCOUNTER — EMERGENCY (EMERGENCY)
Facility: HOSPITAL | Age: 80
LOS: 1 days | End: 2025-05-12
Attending: EMERGENCY MEDICINE
Payer: MEDICARE

## 2025-05-12 VITALS
TEMPERATURE: 98 F | RESPIRATION RATE: 19 BRPM | OXYGEN SATURATION: 95 % | HEART RATE: 75 BPM | SYSTOLIC BLOOD PRESSURE: 121 MMHG | DIASTOLIC BLOOD PRESSURE: 74 MMHG

## 2025-05-12 VITALS
DIASTOLIC BLOOD PRESSURE: 81 MMHG | TEMPERATURE: 98 F | WEIGHT: 223.11 LBS | RESPIRATION RATE: 20 BRPM | HEART RATE: 76 BPM | SYSTOLIC BLOOD PRESSURE: 138 MMHG | OXYGEN SATURATION: 97 % | HEIGHT: 65 IN

## 2025-05-12 DIAGNOSIS — E89.6 POSTPROCEDURAL ADRENOCORTICAL (-MEDULLARY) HYPOFUNCTION: Chronic | ICD-10-CM

## 2025-05-12 DIAGNOSIS — Z90.710 ACQUIRED ABSENCE OF BOTH CERVIX AND UTERUS: Chronic | ICD-10-CM

## 2025-05-12 DIAGNOSIS — Z90.89 ACQUIRED ABSENCE OF OTHER ORGANS: Chronic | ICD-10-CM

## 2025-05-12 PROCEDURE — 73562 X-RAY EXAM OF KNEE 3: CPT

## 2025-05-12 PROCEDURE — 93971 EXTREMITY STUDY: CPT | Mod: 26,LT

## 2025-05-12 PROCEDURE — 99284 EMERGENCY DEPT VISIT MOD MDM: CPT

## 2025-05-12 PROCEDURE — 93971 EXTREMITY STUDY: CPT

## 2025-05-12 PROCEDURE — 73552 X-RAY EXAM OF FEMUR 2/>: CPT

## 2025-05-12 PROCEDURE — 73562 X-RAY EXAM OF KNEE 3: CPT | Mod: 26,LT

## 2025-05-12 PROCEDURE — 73502 X-RAY EXAM HIP UNI 2-3 VIEWS: CPT

## 2025-05-12 PROCEDURE — 99284 EMERGENCY DEPT VISIT MOD MDM: CPT | Mod: 25

## 2025-05-12 PROCEDURE — 73502 X-RAY EXAM HIP UNI 2-3 VIEWS: CPT | Mod: 26,LT

## 2025-05-12 PROCEDURE — 73552 X-RAY EXAM OF FEMUR 2/>: CPT | Mod: 26,LT

## 2025-05-12 RX ORDER — ACETAMINOPHEN 500 MG/5ML
650 LIQUID (ML) ORAL ONCE
Refills: 0 | Status: COMPLETED | OUTPATIENT
Start: 2025-05-12 | End: 2025-05-12

## 2025-05-12 RX ORDER — LIDOCAINE HYDROCHLORIDE 20 MG/ML
1 JELLY TOPICAL ONCE
Refills: 0 | Status: COMPLETED | OUTPATIENT
Start: 2025-05-12 | End: 2025-05-12

## 2025-05-12 RX ADMIN — Medication 650 MILLIGRAM(S): at 12:25

## 2025-05-12 RX ADMIN — LIDOCAINE HYDROCHLORIDE 1 PATCH: 20 JELLY TOPICAL at 12:27

## 2025-05-12 NOTE — ED PROVIDER NOTE - PATIENT PORTAL LINK FT
You can access the FollowMyHealth Patient Portal offered by [facility] by registering at the following website: [email or web address]. By joining [employer]’s FollowMyHealth portal, you will also be able to view your health information using other applications (apps) compatible with our system.

## 2025-05-12 NOTE — ED ADULT TRIAGE NOTE - CHIEF COMPLAINT QUOTE
Mechanical trip and fall into a split, left leg went straight out in front. PT states the pain and ecchymosis is persistent.  Taking Aleve with some relief

## 2025-05-12 NOTE — ED PROVIDER NOTE - PROGRESS NOTE DETAILS
Ultrasound and x-rays negative for acute pathology.  Patient states that she feels better.  Able to ambulate with her cane.  Nadia Rachel DO (PGY1) Attending MD Simon: Patient re-evaluated and feeling improved.  No acute issues at  this time.  Radiology tests reviewed with patient and , Gio.  Patient stable for discharge. Patient will follow up with PMD Dr. Raza. Follow up instructions given, importance of follow up emphasized, return to ED parameters reviewed and patient verbalized understanding.  All questions answered, all concerns addressed. Ultrasound and x-rays negative for acute pathology.  Patient states that she feels better.  Able to ambulate with her cane.  Nadia Rachel DO (PGY1)    Patient is reassessed, states feeling much better at this time. Patient ambulating in ED independently and feeling better, xray with no fx, follow up Ultrasound. UJRAL

## 2025-05-12 NOTE — ED ADULT NURSE NOTE - NSFALLRISKINTERV_ED_ALL_ED

## 2025-05-12 NOTE — ED PROVIDER NOTE - CARE PROVIDER_API CALL
Ludwin Raza  Internal Medicine  [id number] [address] [place] [id number]  Phone: [phone]  Fax: [phone]  Established Patient  Follow Up Time: 1-3 Days

## 2025-05-12 NOTE — ED PROVIDER NOTE - NSFOLLOWUPINSTRUCTIONS_ED_ALL_ED_FT
- You were seen in the emergency department today for left hip pain.    - Imaging results were discussed with you along with your discharge diagnosis.    - Follow up with your primary care doctor in 1 week - bring copies of your results.     - Return to the ED for any new, worsening, or concerning symptoms to you. Return to the emergency department if you are unable to walk, have symptoms of urinary or fecal incontinence or any other concerning symptoms to you.    - Take ibuprofen/tylenol as directed as needed for pain. You can use 500-1000mg Tylenol every 6 hours for pain - as needed; maximal daily dose 3000mg.  This is an over-the-counter medications - please respect the warnings on the label. This medication come with certain risks and side effects that you need to discuss with your doctor, especially if you are taking it for a prolonged period. - You were seen in the emergency department today for left hip pain.    - Imaging results were discussed with you along with your discharge diagnosis.    - Follow up with your primary care doctor in 1-3 days - bring copies of your results. While here, your systolic blood pressure was 130s.  Please mention this to your primary care doctor so they may check it in office.    - Return to the ED for any new, worsening, or concerning symptoms to you. Return to the emergency department if you are unable to walk, have increasing pain, have symptoms of urinary or fecal incontinence or any other concerning symptoms to you.    - Take ibuprofen/tylenol as directed as needed for pain. You can use up to 1000mg Tylenol every 6 hours for pain - as needed; maximal daily dose 3000mg in a 24 hour period.  This is an over-the-counter medications - please respect the warnings on the label. This medication come with certain risks and side effects that you need to discuss with your doctor, especially if you are taking it for a prolonged period.

## 2025-05-12 NOTE — ED ADULT NURSE NOTE - OBJECTIVE STATEMENT
Pt is a 79y F PMH kidney stones, HLD, HTN, CVA c/o L buttock pain, L hip pain s/p mechanical slip and fall while dancing. Pt denies head trauma, LOC. Pt denies cp, sob, ha, numbness/weakness in extremities.

## 2025-05-12 NOTE — ED PROVIDER NOTE - CLINICAL SUMMARY MEDICAL DECISION MAKING FREE TEXT BOX
RGUJRAL 78 yo F with PMH of kidney stones, HLD, HTN, CVA on aspirin 81mg presents today with left buttock and hip pain.  Patient states that she was on a cruise when she was during the electric [place] and slipped and fell onto her left buttock with her leg in a split movement.  Patient denies trauma to the head or LOC.  Patient states she has been ambulating with a cane minimally and has been in the wheelchair since.  Denies any chest pain shortness of breath headache.  No numbness tingling or weakness.  Patient came in today for an evaluation.  On exam patient is well-appearing no acute distress GCS 15.  No chest wall tenderness no abdominal tenderness no thoracic or lumbar tenderness.  Pelvis is stable.  Tender to palpation left posterior buttock.  Positive ecchymosis healing medially and laterally at the knee and proximal leg.  Full range of motion of the ankle and knee.  Full range of motion of the hip.  Positive swelling to the left lower extremity.  Will obtain x-ray and ultrasound to evaluate for fracture and DVT with immobility since the injury.  Pain control and monitor.

## 2025-05-12 NOTE — ED PROVIDER NOTE - SHIFT CHANGE DETAILS
Attending MD Simon: 79F s/p cruise to [facility], fell while dancing on 5/4, ambulating, XR neg, in a wheelchair, now ambulating, pending USr. If US nonactionable, can follow up outpatient.